# Patient Record
Sex: FEMALE | Race: ASIAN | NOT HISPANIC OR LATINO | URBAN - METROPOLITAN AREA
[De-identification: names, ages, dates, MRNs, and addresses within clinical notes are randomized per-mention and may not be internally consistent; named-entity substitution may affect disease eponyms.]

---

## 2020-06-18 ENCOUNTER — OFFICE VISIT (OUTPATIENT)
Dept: URBAN - METROPOLITAN AREA CLINIC 113 | Facility: CLINIC | Age: 50
End: 2020-06-18

## 2020-07-25 ENCOUNTER — TELEPHONE ENCOUNTER (OUTPATIENT)
Dept: URBAN - METROPOLITAN AREA CLINIC 13 | Facility: CLINIC | Age: 50
End: 2020-07-25

## 2020-07-25 RX ORDER — METRONIDAZOLE 500 MG/1
TAKE 1 CAPSULE 3 TIMES DAILY TABLET ORAL
Qty: 21 | Refills: 0 | OUTPATIENT
Start: 2016-06-10 | End: 2017-03-07

## 2020-07-25 RX ORDER — FOLIC ACID 20 MG
TAKE 1 CAPSULE DAILY CAPSULE ORAL
Refills: 0 | OUTPATIENT
Start: 2011-01-20 | End: 2011-02-09

## 2020-07-25 RX ORDER — PHENOBARBITAL, HYOSCYAMINE SULFATE, ATROPINE SULFATE, SCOPOLAMINE HYDROBROMIDE 16.2; .1037; .0194; .0065 MG/1; MG/1; MG/1; MG/1
TAKE 1 TABLET BY MOUTH TWICE A DAY AS NEEDED TABLET ORAL
Qty: 1 | Refills: 3 | OUTPATIENT
Start: 2014-06-25 | End: 2020-06-18

## 2020-07-25 RX ORDER — ETANERCEPT 25 MG
INJECT 25 MG WEEKLY KIT SUBCUTANEOUS
Refills: 0 | OUTPATIENT
End: 2019-10-01

## 2020-07-25 RX ORDER — LIFITEGRAST 50 MG/ML
INSTILL 1 DROP TWICE DAILY IN BOTH EYES SOLUTION/ DROPS OPHTHALMIC
Refills: 0 | OUTPATIENT
Start: 2018-01-05 | End: 2019-10-01

## 2020-07-25 RX ORDER — ONDANSETRON HYDROCHLORIDE 4 MG/1
TAKE 1 TABLET EVERY 8 HRS PRN FOR NAUSEA TABLET, FILM COATED ORAL
Refills: 0 | OUTPATIENT
End: 2019-10-01

## 2020-07-25 RX ORDER — HYOSCYAMINE SULFATE 0.12 MG/1
PLACE 1 TABLET EVERY 4-6 HOURS PRN ABDOMINAL PAIN TABLET, ORALLY DISINTEGRATING ORAL
Qty: 30 | Refills: 0 | OUTPATIENT
Start: 2016-02-11 | End: 2018-04-12

## 2020-07-25 RX ORDER — POLYETHYLENE GLYCOL 3350, SODIUM CHLORIDE, SODIUM BICARBONATE AND POTASSIUM CHLORIDE WITH LEMON FLAVOR 420; 11.2; 5.72; 1.48 G/4L; G/4L; G/4L; G/4L
TAKE AS DIRECTED POWDER, FOR SOLUTION ORAL
Qty: 1 | Refills: 0 | OUTPATIENT
Start: 2018-11-27 | End: 2018-12-05

## 2020-07-25 RX ORDER — TRAMADOL HYDROCHLORIDE 50 MG/1
TAKE 1 TABLET DAILY TABLET ORAL
Refills: 0 | OUTPATIENT
End: 2019-10-01

## 2020-07-25 RX ORDER — METRONIDAZOLE 500 MG/1
TAKE 1 TABLET EVERY 8 HOURS TABLET ORAL
Refills: 0 | OUTPATIENT
End: 2019-04-30

## 2020-07-25 RX ORDER — CIPROFLOXACIN HYDROCHLORIDE 500 MG/1
TAKE 1 TABLET TWICE DAILY TABLET, FILM COATED ORAL
Refills: 0 | OUTPATIENT
End: 2019-04-30

## 2020-07-25 RX ORDER — HYDROCODONE BITARTRATE AND ACETAMINOPHEN 5; 325 MG/1; MG/1
TAKE 1 TABLET EVERY 4 TO 6 HOURS AS NEEDED FOR PAIN TABLET ORAL
Refills: 0 | OUTPATIENT
End: 2015-12-10

## 2020-07-25 RX ORDER — ESOMEPRAZOLE MAGNESIUM 40 MG
TAKE 1 CAPSULE DAILY CAPSULE,DELAYED RELEASE (ENTERIC COATED) ORAL
Refills: 0 | OUTPATIENT
Start: 2011-01-20 | End: 2011-02-17

## 2020-07-25 RX ORDER — OXYCODONE HYDROCHLORIDE AND ACETAMINOPHEN 5; 325 MG/1; MG/1
TAKE 1 TABLET EVERY 6 HOURS TAKE 1 TABLET BY MOUTH EVERY 6-8 HOURS AS NEEDED TABLET ORAL
Qty: 120 | Refills: 0 | OUTPATIENT
End: 2019-10-01

## 2020-07-25 RX ORDER — CELECOXIB 200 MG/1
TAKE 1 CAPSULE DAILY CAPSULE ORAL
Refills: 0 | OUTPATIENT
End: 2018-04-12

## 2020-07-25 RX ORDER — CELECOXIB 50 MG/1
TAKE 1 CAPSULE TWICE DAILY CAPSULE ORAL
Refills: 0 | OUTPATIENT
Start: 2011-01-20 | End: 2011-02-09

## 2020-07-25 RX ORDER — DICYCLOMINE HYDROCHLORIDE 10 MG/1
TAKE 1 CAPSULE EVERY 6 HOURS PRN ABD PAIN CAPSULE ORAL
Qty: 45 | Refills: 1 | OUTPATIENT
Start: 2019-05-17 | End: 2019-10-01

## 2020-07-25 RX ORDER — LINACLOTIDE 290 UG/1
TAKE 1 CAPSULE DAILY CAPSULE, GELATIN COATED ORAL
Qty: 30 | Refills: 5 | OUTPATIENT
Start: 2013-10-28 | End: 2015-12-10

## 2020-07-25 RX ORDER — METHOTREXATE 2.5 MG/1
TAKE 6 TABLETS WEEKLY TABLET ORAL
Refills: 0 | OUTPATIENT
Start: 2011-01-20 | End: 2011-02-09

## 2020-07-26 ENCOUNTER — TELEPHONE ENCOUNTER (OUTPATIENT)
Dept: URBAN - METROPOLITAN AREA CLINIC 13 | Facility: CLINIC | Age: 50
End: 2020-07-26

## 2020-07-26 RX ORDER — LINACLOTIDE 290 UG/1
CAPSULE, GELATIN COATED ORAL
Qty: 30 | Refills: 0 | Status: ACTIVE | COMMUNITY
Start: 2013-10-28

## 2020-07-26 RX ORDER — PREDNISONE 10 MG/1
U UTD TABLET ORAL
Qty: 21 | Refills: 0 | Status: ACTIVE | COMMUNITY
Start: 2020-03-17

## 2020-07-26 RX ORDER — HYDROCORTISONE ACETATE 25 MG/1
SUPPOSITORY RECTAL
Qty: 12 | Refills: 0 | Status: ACTIVE | COMMUNITY
Start: 2013-10-28

## 2020-07-26 RX ORDER — GABAPENTIN 100 MG/1
CAPSULE ORAL
Qty: 120 | Refills: 0 | Status: ACTIVE | COMMUNITY
Start: 2014-04-07

## 2020-07-26 RX ORDER — OMEPRAZOLE 20 MG/1
CAPSULE, DELAYED RELEASE ORAL
Qty: 30 | Refills: 0 | Status: ACTIVE | COMMUNITY
Start: 2013-01-31

## 2020-07-26 RX ORDER — LINACLOTIDE 290 UG/1
TAKE 1 CAPSULE DAILY TAKE BEFORE MEALS CAPSULE, GELATIN COATED ORAL
Qty: 90 | Refills: 3 | Status: ACTIVE | COMMUNITY
Start: 2015-12-10

## 2020-07-26 RX ORDER — ETANERCEPT 50 MG/ML
SOLUTION SUBCUTANEOUS
Qty: 392 | Refills: 0 | Status: ACTIVE | COMMUNITY
Start: 2017-12-05

## 2020-07-26 RX ORDER — SIMVASTATIN 40 MG/1
TABLET, FILM COATED ORAL
Qty: 30 | Refills: 0 | Status: ACTIVE | COMMUNITY
Start: 2014-02-18

## 2020-07-26 RX ORDER — DICLOFENAC POTASSIUM 50 MG/1
TABLET, FILM COATED ORAL
Qty: 30 | Refills: 0 | Status: ACTIVE | COMMUNITY
Start: 2017-11-08

## 2020-07-26 RX ORDER — PROMETHAZINE HYDROCHLORIDE AND CODEINE PHOSPHATE 6.25; 1 MG/5ML; MG/5ML
TK 5 TO 10 ML PO Q 6 H PRF COUGH SOLUTION ORAL
Qty: 240 | Refills: 0 | Status: ACTIVE | COMMUNITY
Start: 2020-03-25

## 2020-07-26 RX ORDER — BUTALBITAL, ACETAMINOPHEN, AND CAFFEINE 50; 325; 40 MG/1; MG/1; MG/1
TABLET ORAL
Qty: 84 | Refills: 0 | Status: ACTIVE | COMMUNITY
Start: 2014-09-24

## 2020-07-26 RX ORDER — LEVOFLOXACIN 500 MG/1
TK 1 T PO D TABLET, FILM COATED ORAL
Qty: 7 | Refills: 0 | Status: ACTIVE | COMMUNITY
Start: 2020-03-25

## 2020-07-26 RX ORDER — HYDROCODONE BITARTRATE AND ACETAMINOPHEN 7.5; 325 MG/1; MG/1
TABLET ORAL
Qty: 90 | Refills: 0 | Status: ACTIVE | COMMUNITY
Start: 2014-12-05

## 2020-07-26 RX ORDER — TRAMADOL HYDROCHLORIDE 50 MG/1
TABLET ORAL
Qty: 120 | Refills: 0 | Status: ACTIVE | COMMUNITY
Start: 2014-04-07

## 2020-07-26 RX ORDER — PREDNISONE 10 MG/1
TABLET ORAL
Qty: 21 | Refills: 0 | Status: ACTIVE | COMMUNITY
Start: 2019-02-21

## 2020-07-26 RX ORDER — ETANERCEPT 50 MG/ML
INJECT 50 MG WEEKLY SOLUTION SUBCUTANEOUS
Refills: 0 | Status: ACTIVE | COMMUNITY
Start: 2018-10-19

## 2020-07-26 RX ORDER — CARISOPRODOL 350 MG/1
TABLET ORAL
Qty: 30 | Refills: 0 | Status: ACTIVE | COMMUNITY
Start: 2018-06-19

## 2020-07-26 RX ORDER — HYDROCHLOROTHIAZIDE 12.5 MG/1
TK 1 C PO QD CAPSULE ORAL
Qty: 90 | Refills: 0 | Status: ACTIVE | COMMUNITY
Start: 2019-11-22

## 2020-07-26 RX ORDER — DICYCLOMINE HYDROCHLORIDE 10 MG/1
TAKE 1 CAPSULE EVERY 6 HOURS PRN ABDOMINAL PAIN CAPSULE ORAL
Qty: 30 | Refills: 2 | Status: ACTIVE | COMMUNITY
Start: 2020-06-18

## 2020-07-26 RX ORDER — THYROID, PORCINE 60 MG/1
TAKE 1 TABLET DAILY TABLET ORAL
Refills: 0 | Status: ACTIVE | COMMUNITY
Start: 2019-02-12

## 2020-07-26 RX ORDER — CODEINE PHOSPHATE AND GUAIFENESIN 10; 100 MG/5ML; MG/5ML
TK 10 ML PO QID PRF COUGH LIQUID ORAL
Qty: 180 | Refills: 0 | Status: ACTIVE | COMMUNITY
Start: 2020-03-17

## 2020-07-26 RX ORDER — ETANERCEPT 50 MG/ML
SOLUTION SUBCUTANEOUS
Qty: 3 | Refills: 0 | Status: ACTIVE | COMMUNITY
Start: 2018-04-12

## 2020-07-26 RX ORDER — METHOTREXATE 2.5 MG/1
TAKE 8 TABLETS PER WEEK TABLET ORAL
Refills: 0 | Status: ACTIVE | COMMUNITY

## 2020-07-26 RX ORDER — METRONIDAZOLE 500 MG/1
TAKE 1 TABLET 3 TIMES DAILY X 14 DAYS TABLET ORAL
Qty: 42 | Refills: 0 | Status: ACTIVE | COMMUNITY
Start: 2020-06-18

## 2020-07-26 RX ORDER — BUSPIRONE HYDROCHLORIDE 15 MG/1
TABLET ORAL
Qty: 90 | Refills: 0 | Status: ACTIVE | COMMUNITY
Start: 2014-10-20

## 2020-07-26 RX ORDER — ONDANSETRON HYDROCHLORIDE 4 MG/1
TABLET, FILM COATED ORAL
Qty: 8 | Refills: 0 | Status: ACTIVE | COMMUNITY
Start: 2017-11-08

## 2020-07-26 RX ORDER — CARISOPRODOL 350 MG/1
TABLET ORAL
Qty: 90 | Refills: 0 | Status: ACTIVE | COMMUNITY
Start: 2019-02-21

## 2020-07-26 RX ORDER — LIFITEGRAST 50 MG/ML
SOLUTION/ DROPS OPHTHALMIC
Qty: 180 | Refills: 0 | Status: ACTIVE | COMMUNITY
Start: 2018-01-05

## 2020-07-26 RX ORDER — HYDROCODONE BITARTRATE AND HOMATROPINE METHYLBROMIDE 5; 1.5 MG/5ML; MG/5ML
TAKE 1 TEASPOONFUL BY MOUTH 4 TIMES A DAY AS NEEDED SYRUP ORAL
Qty: 200 | Refills: 0 | Status: ACTIVE | COMMUNITY
Start: 2019-11-26

## 2020-07-26 RX ORDER — ESOMEPRAZOLE MAGNESIUM 40 MG
TAKE 1 CAPSULE DAILY CAPSULE,DELAYED RELEASE (ENTERIC COATED) ORAL
Refills: 0 | Status: ACTIVE | COMMUNITY

## 2020-07-26 RX ORDER — FLUTICASONE PROPIONATE 50 UG/1
USE 2 SPRAYS IEN QD SPRAY, METERED NASAL
Qty: 16 | Refills: 0 | Status: ACTIVE | COMMUNITY
Start: 2020-02-25

## 2020-07-26 RX ORDER — AMITRIPTYLINE HYDROCHLORIDE 10 MG/1
TABLET, FILM COATED ORAL
Qty: 90 | Refills: 0 | Status: ACTIVE | COMMUNITY
Start: 2014-10-31

## 2020-07-26 RX ORDER — SIMVASTATIN 40 MG/1
TABLET, FILM COATED ORAL
Qty: 30 | Refills: 0 | Status: ACTIVE | COMMUNITY
Start: 2013-05-28

## 2020-07-26 RX ORDER — OMEPRAZOLE 40 MG/1
CAPSULE, DELAYED RELEASE ORAL
Qty: 90 | Refills: 0 | Status: ACTIVE | COMMUNITY
Start: 2019-04-30

## 2020-07-26 RX ORDER — CIPROFLOXACIN HYDROCHLORIDE 500 MG/1
TAKE ONE TABLET BY MOUTH TWICE A DAY TABLET, FILM COATED ORAL
Qty: 28 | Refills: 0 | Status: ACTIVE | COMMUNITY
Start: 2020-06-18

## 2020-07-26 RX ORDER — SIMVASTATIN 20 MG/1
TAKE 1 TABLET AT BEDTIME TABLET, FILM COATED ORAL
Refills: 0 | Status: ACTIVE | COMMUNITY
Start: 2018-06-19

## 2020-07-26 RX ORDER — TIZANIDINE 4 MG/1
TABLET ORAL
Qty: 30 | Refills: 0 | Status: ACTIVE | COMMUNITY
Start: 2015-01-06

## 2020-07-26 RX ORDER — LINACLOTIDE 145 UG/1
CAPSULE, GELATIN COATED ORAL
Qty: 30 | Refills: 0 | Status: ACTIVE | COMMUNITY
Start: 2013-09-05

## 2020-07-26 RX ORDER — LOTEPREDNOL ETABONATE 5 MG/G
GEL OPHTHALMIC
Qty: 5 | Refills: 0 | Status: ACTIVE | COMMUNITY
Start: 2017-08-18

## 2020-07-26 RX ORDER — SUCRALFATE 1 G/10ML
SUSPENSION ORAL
Qty: 255 | Refills: 0 | Status: ACTIVE | COMMUNITY
Start: 2014-04-22

## 2020-07-26 RX ORDER — ONDANSETRON HYDROCHLORIDE 4 MG/1
TABLET, FILM COATED ORAL
Qty: 40 | Refills: 0 | Status: ACTIVE | COMMUNITY
Start: 2014-10-02

## 2020-07-26 RX ORDER — HYDROCODONE BITARTRATE AND ACETAMINOPHEN 10; 325 MG/1; MG/1
TABLET ORAL
Qty: 30 | Refills: 0 | Status: ACTIVE | COMMUNITY
Start: 2014-09-27

## 2020-07-26 RX ORDER — FOLIC ACID 1 MG/1
TAKE 1 TABLET DAILY TABLET ORAL
Refills: 0 | Status: ACTIVE | COMMUNITY

## 2020-07-26 RX ORDER — AMITRIPTYLINE HYDROCHLORIDE 25 MG/1
TABLET, FILM COATED ORAL
Qty: 30 | Refills: 0 | Status: ACTIVE | COMMUNITY
Start: 2014-09-27

## 2020-07-26 RX ORDER — ETANERCEPT 50 MG/ML
SOLUTION SUBCUTANEOUS
Qty: 392 | Refills: 0 | Status: ACTIVE | COMMUNITY
Start: 2018-12-18

## 2020-07-26 RX ORDER — LORAZEPAM 1 MG/1
TK 1 T PO Q 8 H TABLET ORAL
Qty: 10 | Refills: 0 | Status: ACTIVE | COMMUNITY
Start: 2020-01-14

## 2020-07-26 RX ORDER — HYDROCORTISONE ACETATE 25 MG/1
SUPPOSITORY RECTAL
Qty: 28 | Refills: 0 | Status: ACTIVE | COMMUNITY
Start: 2013-06-21

## 2020-07-26 RX ORDER — IBUPROFEN 600 MG/1
TABLET ORAL
Qty: 30 | Refills: 0 | Status: ACTIVE | COMMUNITY
Start: 2014-11-05

## 2020-07-26 RX ORDER — DOXYCYCLINE HYCLATE 100 MG/1
TK 1 T PO BID FOR 10 DAYS TABLET ORAL
Qty: 20 | Refills: 0 | Status: ACTIVE | COMMUNITY
Start: 2020-03-17

## 2021-06-13 ENCOUNTER — ERX REFILL RESPONSE (OUTPATIENT)
Dept: URBAN - METROPOLITAN AREA CLINIC 72 | Facility: CLINIC | Age: 51
End: 2021-06-13

## 2021-06-13 RX ORDER — LINACLOTIDE 290 UG/1
TAKE 1 CAPSULE DAILY BEFORE A MEAL CAPSULE, GELATIN COATED ORAL
Qty: 90 | Refills: 0

## 2021-07-20 ENCOUNTER — OFFICE VISIT (OUTPATIENT)
Dept: URBAN - METROPOLITAN AREA CLINIC 113 | Facility: CLINIC | Age: 51
End: 2021-07-20

## 2021-07-30 ENCOUNTER — OFFICE VISIT (OUTPATIENT)
Dept: URBAN - METROPOLITAN AREA CLINIC 113 | Facility: CLINIC | Age: 51
End: 2021-07-30
Payer: COMMERCIAL

## 2021-07-30 VITALS
BODY MASS INDEX: 26.62 KG/M2 | WEIGHT: 141 LBS | SYSTOLIC BLOOD PRESSURE: 115 MMHG | HEIGHT: 61 IN | HEART RATE: 73 BPM | TEMPERATURE: 98.2 F | DIASTOLIC BLOOD PRESSURE: 73 MMHG

## 2021-07-30 DIAGNOSIS — K59.01 SLOW TRANSIT CONSTIPATION: ICD-10-CM

## 2021-07-30 DIAGNOSIS — R10.13 EPIGASTRIC ABDOMINAL PAIN: ICD-10-CM

## 2021-07-30 DIAGNOSIS — K21.9 GERD: ICD-10-CM

## 2021-07-30 PROCEDURE — 99214 OFFICE O/P EST MOD 30 MIN: CPT | Performed by: INTERNAL MEDICINE

## 2021-07-30 RX ORDER — HYDROCODONE BITARTRATE AND HOMATROPINE METHYLBROMIDE 5; 1.5 MG/5ML; MG/5ML
TAKE 1 TEASPOONFUL BY MOUTH 4 TIMES A DAY AS NEEDED SYRUP ORAL
Qty: 200 | Refills: 0 | Status: ON HOLD | COMMUNITY
Start: 2019-11-26

## 2021-07-30 RX ORDER — FOLIC ACID 1 MG/1
TAKE 1 TABLET DAILY TABLET ORAL
Refills: 0 | Status: ON HOLD | COMMUNITY

## 2021-07-30 RX ORDER — AMITRIPTYLINE HYDROCHLORIDE 10 MG/1
TABLET, FILM COATED ORAL
Qty: 90 | Refills: 0 | Status: ON HOLD | COMMUNITY
Start: 2014-10-31

## 2021-07-30 RX ORDER — TIZANIDINE 4 MG/1
TABLET ORAL
Qty: 30 | Refills: 0 | Status: ON HOLD | COMMUNITY
Start: 2015-01-06

## 2021-07-30 RX ORDER — BUSPIRONE HYDROCHLORIDE 15 MG/1
TABLET ORAL
Qty: 90 | Refills: 0 | Status: ON HOLD | COMMUNITY
Start: 2014-10-20

## 2021-07-30 RX ORDER — CODEINE PHOSPHATE AND GUAIFENESIN 10; 100 MG/5ML; MG/5ML
TK 10 ML PO QID PRF COUGH LIQUID ORAL
Qty: 180 | Refills: 0 | Status: ON HOLD | COMMUNITY
Start: 2020-03-17

## 2021-07-30 RX ORDER — DOXYCYCLINE HYCLATE 100 MG/1
TK 1 T PO BID FOR 10 DAYS TABLET ORAL
Qty: 20 | Refills: 0 | Status: ON HOLD | COMMUNITY
Start: 2020-03-17

## 2021-07-30 RX ORDER — LEVOFLOXACIN 500 MG/1
TK 1 T PO D TABLET, FILM COATED ORAL
Qty: 7 | Refills: 0 | Status: ON HOLD | COMMUNITY
Start: 2020-03-25

## 2021-07-30 RX ORDER — METHOTREXATE 2.5 MG/1
TAKE 8 TABLETS PER WEEK TABLET ORAL
Refills: 0 | Status: ACTIVE | COMMUNITY

## 2021-07-30 RX ORDER — DICYCLOMINE HYDROCHLORIDE 10 MG/1
TAKE 1 CAPSULE EVERY 6 HOURS PRN ABDOMINAL PAIN CAPSULE ORAL
Qty: 30 | Refills: 2 | Status: ON HOLD | COMMUNITY
Start: 2020-06-18

## 2021-07-30 RX ORDER — DICLOFENAC POTASSIUM 50 MG/1
TABLET, FILM COATED ORAL
Qty: 30 | Refills: 0 | Status: ON HOLD | COMMUNITY
Start: 2017-11-08

## 2021-07-30 RX ORDER — LOTEPREDNOL ETABONATE 5 MG/G
GEL OPHTHALMIC
Qty: 5 | Refills: 0 | Status: ON HOLD | COMMUNITY
Start: 2017-08-18

## 2021-07-30 RX ORDER — LINACLOTIDE 145 UG/1
CAPSULE, GELATIN COATED ORAL
Qty: 30 | Refills: 0 | Status: ACTIVE | COMMUNITY
Start: 2013-09-05

## 2021-07-30 RX ORDER — LORAZEPAM 1 MG/1
TK 1 T PO Q 8 H TABLET ORAL
Qty: 10 | Refills: 0 | Status: ON HOLD | COMMUNITY
Start: 2020-01-14

## 2021-07-30 RX ORDER — HYDROCODONE BITARTRATE AND ACETAMINOPHEN 7.5; 325 MG/1; MG/1
TABLET ORAL
Qty: 90 | Refills: 0 | Status: ON HOLD | COMMUNITY
Start: 2014-12-05

## 2021-07-30 RX ORDER — GABAPENTIN 100 MG/1
CAPSULE ORAL
Qty: 120 | Refills: 0 | Status: ON HOLD | COMMUNITY
Start: 2014-04-07

## 2021-07-30 RX ORDER — SUCRALFATE 1 G/10ML
SUSPENSION ORAL
Qty: 255 | Refills: 0 | Status: ON HOLD | COMMUNITY
Start: 2014-04-22

## 2021-07-30 RX ORDER — HYDROCHLOROTHIAZIDE 12.5 MG/1
TK 1 C PO QD CAPSULE ORAL
Qty: 90 | Refills: 0 | Status: ON HOLD | COMMUNITY
Start: 2019-11-22

## 2021-07-30 RX ORDER — OMEPRAZOLE 40 MG/1
1 CAPSULE 30 MINUTES BEFORE MORNING MEAL CAPSULE, DELAYED RELEASE ORAL ONCE A DAY
Refills: 0 | Status: ACTIVE | COMMUNITY
Start: 2013-01-31

## 2021-07-30 RX ORDER — ESOMEPRAZOLE MAGNESIUM 40 MG
TAKE 1 CAPSULE DAILY CAPSULE,DELAYED RELEASE (ENTERIC COATED) ORAL
Refills: 0 | Status: ON HOLD | COMMUNITY

## 2021-07-30 RX ORDER — LINACLOTIDE 290 UG/1
TAKE 1 CAPSULE DAILY BEFORE A MEAL CAPSULE, GELATIN COATED ORAL
Qty: 90 | Refills: 0 | Status: ON HOLD | COMMUNITY

## 2021-07-30 RX ORDER — ETANERCEPT 50 MG/ML
SOLUTION SUBCUTANEOUS
Qty: 392 | Refills: 0 | Status: ACTIVE | COMMUNITY
Start: 2017-12-05

## 2021-07-30 RX ORDER — CARISOPRODOL 350 MG/1
TABLET ORAL
Qty: 30 | Refills: 0 | Status: ON HOLD | COMMUNITY
Start: 2018-06-19

## 2021-07-30 RX ORDER — TRAMADOL HYDROCHLORIDE 50 MG/1
TABLET ORAL
Qty: 120 | Refills: 0 | Status: ON HOLD | COMMUNITY
Start: 2014-04-07

## 2021-07-30 RX ORDER — ONDANSETRON HYDROCHLORIDE 4 MG/1
TABLET, FILM COATED ORAL
Qty: 40 | Refills: 0 | Status: ON HOLD | COMMUNITY
Start: 2014-10-02

## 2021-07-30 RX ORDER — PROMETHAZINE HYDROCHLORIDE AND CODEINE PHOSPHATE 6.25; 1 MG/5ML; MG/5ML
TK 5 TO 10 ML PO Q 6 H PRF COUGH SOLUTION ORAL
Qty: 240 | Refills: 0 | Status: ON HOLD | COMMUNITY
Start: 2020-03-25

## 2021-07-30 RX ORDER — THYROID, PORCINE 60 MG/1
TAKE 1 TABLET DAILY TABLET ORAL
Refills: 0 | Status: ON HOLD | COMMUNITY
Start: 2019-02-12

## 2021-07-30 RX ORDER — IBUPROFEN 600 MG/1
TABLET ORAL
Qty: 30 | Refills: 0 | Status: ON HOLD | COMMUNITY
Start: 2014-11-05

## 2021-07-30 RX ORDER — FLUTICASONE PROPIONATE 50 UG/1
USE 2 SPRAYS IEN QD SPRAY, METERED NASAL
Qty: 16 | Refills: 0 | Status: ON HOLD | COMMUNITY
Start: 2020-02-25

## 2021-07-30 RX ORDER — OMEPRAZOLE 40 MG/1
CAPSULE, DELAYED RELEASE ORAL
Qty: 90 | Refills: 0 | Status: ON HOLD | COMMUNITY
Start: 2019-04-30

## 2021-07-30 RX ORDER — METRONIDAZOLE 500 MG/1
TAKE 1 TABLET 3 TIMES DAILY X 14 DAYS TABLET ORAL
Qty: 42 | Refills: 0 | Status: ON HOLD | COMMUNITY
Start: 2020-06-18

## 2021-07-30 RX ORDER — SIMVASTATIN 40 MG/1
TABLET, FILM COATED ORAL
Qty: 30 | Refills: 0 | Status: ON HOLD | COMMUNITY
Start: 2013-05-28

## 2021-07-30 RX ORDER — AMITRIPTYLINE HYDROCHLORIDE 25 MG/1
TABLET, FILM COATED ORAL
Qty: 30 | Refills: 0 | Status: ON HOLD | COMMUNITY
Start: 2014-09-27

## 2021-07-30 RX ORDER — CIPROFLOXACIN HYDROCHLORIDE 500 MG/1
TAKE ONE TABLET BY MOUTH TWICE A DAY TABLET, FILM COATED ORAL
Qty: 28 | Refills: 0 | Status: ON HOLD | COMMUNITY
Start: 2020-06-18

## 2021-07-30 RX ORDER — BUTALBITAL, ACETAMINOPHEN, AND CAFFEINE 50; 325; 40 MG/1; MG/1; MG/1
TABLET ORAL
Qty: 84 | Refills: 0 | Status: ON HOLD | COMMUNITY
Start: 2014-09-24

## 2021-07-30 RX ORDER — HYDROCORTISONE ACETATE 25 MG/1
SUPPOSITORY RECTAL
Qty: 28 | Refills: 0 | Status: ON HOLD | COMMUNITY
Start: 2013-06-21

## 2021-07-30 RX ORDER — PREDNISONE 10 MG/1
TABLET ORAL
Qty: 21 | Refills: 0 | Status: ON HOLD | COMMUNITY
Start: 2019-02-21

## 2021-07-30 RX ORDER — OMEPRAZOLE 40 MG/1
1 CAPSULE TWICE DAILY, PRIOR TO BREAKFAST AND DINNER CAPSULE, DELAYED RELEASE ORAL TWICE DAILY
Qty: 180 | Refills: 3 | OUTPATIENT
Start: 2021-07-30

## 2021-07-30 RX ORDER — HYDROCODONE BITARTRATE AND ACETAMINOPHEN 10; 325 MG/1; MG/1
TABLET ORAL
Qty: 30 | Refills: 0 | Status: ON HOLD | COMMUNITY
Start: 2014-09-27

## 2021-07-30 RX ORDER — SIMVASTATIN 20 MG/1
TAKE 1 TABLET AT BEDTIME TABLET, FILM COATED ORAL
Refills: 0 | Status: ACTIVE | COMMUNITY
Start: 2018-06-19

## 2021-07-30 RX ORDER — LIFITEGRAST 50 MG/ML
SOLUTION/ DROPS OPHTHALMIC
Qty: 180 | Refills: 0 | Status: ON HOLD | COMMUNITY
Start: 2018-01-05

## 2021-07-30 NOTE — HPI-TODAY'S VISIT:
50-year-old female with a history of rheumatoid arthritis, hypothyroidism, constipation predominant irritable bowel syndrome, GERD, diverticulitis, presenting for follow-up. She was last seen 6/18/2020 for evaluation of left lower quadrant abdominal pain concerning for diverticulitis.  She has recommended a 14-day course of Cipro and metronidazole, and dicyclomine was provided to use as needed.  Regarding constipation, she was instructed to continue a daily bowel regimen with Linzess 290 mcg. Within the last week, she has experienced nightly exacerbations of epigastric pain. The discomfort begins following dinner, radiating to her back. This is alleviated with the use of OTC Nexium or Tums. She denies associated nausea or vomiting, but does complain of breakthrough heartburn and regurgitation a few times per week despite her current regimen with omeprazole 40mg daily. Her constipation is managed with Linzess, but she thinks the dose was reduced to 145mcg daily. She denies NSAID use. Her last EGD was performed in 2013 by Dr. Jones and was normal.

## 2021-08-16 ENCOUNTER — ERX REFILL RESPONSE (OUTPATIENT)
Dept: URBAN - METROPOLITAN AREA CLINIC 72 | Facility: CLINIC | Age: 51
End: 2021-08-16

## 2021-08-16 RX ORDER — LINACLOTIDE 290 UG/1
TAKE 1 CAPSULE BY MOUTH  DAILY BEFORE A MEAL CAPSULE, GELATIN COATED ORAL
Qty: 90 CAPSULE | Refills: 4 | OUTPATIENT

## 2021-08-31 ENCOUNTER — OFFICE VISIT (OUTPATIENT)
Dept: URBAN - METROPOLITAN AREA SURGERY CENTER 25 | Facility: SURGERY CENTER | Age: 51
End: 2021-08-31

## 2021-09-03 ENCOUNTER — CLAIMS CREATED FROM THE CLAIM WINDOW (OUTPATIENT)
Dept: URBAN - METROPOLITAN AREA CLINIC 4 | Facility: CLINIC | Age: 51
End: 2021-09-03
Payer: COMMERCIAL

## 2021-09-03 ENCOUNTER — OFFICE VISIT (OUTPATIENT)
Dept: URBAN - METROPOLITAN AREA SURGERY CENTER 25 | Facility: SURGERY CENTER | Age: 51
End: 2021-09-03
Payer: COMMERCIAL

## 2021-09-03 DIAGNOSIS — K31.89 REACTIVE GASTROPATHY: ICD-10-CM

## 2021-09-03 DIAGNOSIS — R10.13 ABDOMINAL PAIN, EPIGASTRIC: ICD-10-CM

## 2021-09-03 DIAGNOSIS — K31.89 SUBEPITHELIAL MASS OF STOMACH: ICD-10-CM

## 2021-09-03 PROCEDURE — 43239 EGD BIOPSY SINGLE/MULTIPLE: CPT | Performed by: INTERNAL MEDICINE

## 2021-09-03 PROCEDURE — 88312 SPECIAL STAINS GROUP 1: CPT | Performed by: PATHOLOGY

## 2021-09-03 PROCEDURE — 88305 TISSUE EXAM BY PATHOLOGIST: CPT | Performed by: PATHOLOGY

## 2021-09-03 PROCEDURE — G8907 PT DOC NO EVENTS ON DISCHARG: HCPCS | Performed by: INTERNAL MEDICINE

## 2021-09-03 RX ORDER — AMITRIPTYLINE HYDROCHLORIDE 10 MG/1
TABLET, FILM COATED ORAL
Qty: 90 | Refills: 0 | Status: ON HOLD | COMMUNITY
Start: 2014-10-31

## 2021-09-03 RX ORDER — ETANERCEPT 50 MG/ML
SOLUTION SUBCUTANEOUS
Qty: 392 | Refills: 0 | Status: ACTIVE | COMMUNITY
Start: 2017-12-05

## 2021-09-03 RX ORDER — CARISOPRODOL 350 MG/1
TABLET ORAL
Qty: 30 | Refills: 0 | Status: ON HOLD | COMMUNITY
Start: 2018-06-19

## 2021-09-03 RX ORDER — IBUPROFEN 600 MG/1
TABLET ORAL
Qty: 30 | Refills: 0 | Status: ON HOLD | COMMUNITY
Start: 2014-11-05

## 2021-09-03 RX ORDER — HYDROCHLOROTHIAZIDE 12.5 MG/1
TK 1 C PO QD CAPSULE ORAL
Qty: 90 | Refills: 0 | Status: ON HOLD | COMMUNITY
Start: 2019-11-22

## 2021-09-03 RX ORDER — DICYCLOMINE HYDROCHLORIDE 10 MG/1
TAKE 1 CAPSULE EVERY 6 HOURS PRN ABDOMINAL PAIN CAPSULE ORAL
Qty: 30 | Refills: 2 | Status: ON HOLD | COMMUNITY
Start: 2020-06-18

## 2021-09-03 RX ORDER — ONDANSETRON HYDROCHLORIDE 4 MG/1
TABLET, FILM COATED ORAL
Qty: 40 | Refills: 0 | Status: ON HOLD | COMMUNITY
Start: 2014-10-02

## 2021-09-03 RX ORDER — ESOMEPRAZOLE MAGNESIUM 40 MG
TAKE 1 CAPSULE DAILY CAPSULE,DELAYED RELEASE (ENTERIC COATED) ORAL
Refills: 0 | Status: ON HOLD | COMMUNITY

## 2021-09-03 RX ORDER — OMEPRAZOLE 40 MG/1
1 CAPSULE 30 MINUTES BEFORE MORNING MEAL CAPSULE, DELAYED RELEASE ORAL ONCE A DAY
Refills: 0 | Status: ACTIVE | COMMUNITY
Start: 2013-01-31

## 2021-09-03 RX ORDER — LOTEPREDNOL ETABONATE 5 MG/G
GEL OPHTHALMIC
Qty: 5 | Refills: 0 | Status: ON HOLD | COMMUNITY
Start: 2017-08-18

## 2021-09-03 RX ORDER — FOLIC ACID 1 MG/1
TAKE 1 TABLET DAILY TABLET ORAL
Refills: 0 | Status: ON HOLD | COMMUNITY

## 2021-09-03 RX ORDER — SIMVASTATIN 40 MG/1
TABLET, FILM COATED ORAL
Qty: 30 | Refills: 0 | Status: ON HOLD | COMMUNITY
Start: 2013-05-28

## 2021-09-03 RX ORDER — HYDROCODONE BITARTRATE AND ACETAMINOPHEN 10; 325 MG/1; MG/1
TABLET ORAL
Qty: 30 | Refills: 0 | Status: ON HOLD | COMMUNITY
Start: 2014-09-27

## 2021-09-03 RX ORDER — BUTALBITAL, ACETAMINOPHEN, AND CAFFEINE 50; 325; 40 MG/1; MG/1; MG/1
TABLET ORAL
Qty: 84 | Refills: 0 | Status: ON HOLD | COMMUNITY
Start: 2014-09-24

## 2021-09-03 RX ORDER — LIFITEGRAST 50 MG/ML
SOLUTION/ DROPS OPHTHALMIC
Qty: 180 | Refills: 0 | Status: ON HOLD | COMMUNITY
Start: 2018-01-05

## 2021-09-03 RX ORDER — LEVOFLOXACIN 500 MG/1
TK 1 T PO D TABLET, FILM COATED ORAL
Qty: 7 | Refills: 0 | Status: ON HOLD | COMMUNITY
Start: 2020-03-25

## 2021-09-03 RX ORDER — OMEPRAZOLE 40 MG/1
1 CAPSULE TWICE DAILY, PRIOR TO BREAKFAST AND DINNER CAPSULE, DELAYED RELEASE ORAL TWICE DAILY
Qty: 180 | Refills: 3 | Status: ACTIVE | COMMUNITY
Start: 2021-07-30

## 2021-09-03 RX ORDER — LINACLOTIDE 290 UG/1
TAKE 1 CAPSULE BY MOUTH  DAILY BEFORE A MEAL CAPSULE, GELATIN COATED ORAL
Qty: 90 CAPSULE | Refills: 4 | Status: ACTIVE | COMMUNITY

## 2021-09-03 RX ORDER — AMITRIPTYLINE HYDROCHLORIDE 25 MG/1
TABLET, FILM COATED ORAL
Qty: 30 | Refills: 0 | Status: ON HOLD | COMMUNITY
Start: 2014-09-27

## 2021-09-03 RX ORDER — CIPROFLOXACIN HYDROCHLORIDE 500 MG/1
TAKE ONE TABLET BY MOUTH TWICE A DAY TABLET, FILM COATED ORAL
Qty: 28 | Refills: 0 | Status: ON HOLD | COMMUNITY
Start: 2020-06-18

## 2021-09-03 RX ORDER — HYDROCORTISONE ACETATE 25 MG/1
SUPPOSITORY RECTAL
Qty: 28 | Refills: 0 | Status: ON HOLD | COMMUNITY
Start: 2013-06-21

## 2021-09-03 RX ORDER — HYDROCODONE BITARTRATE AND HOMATROPINE METHYLBROMIDE 5; 1.5 MG/5ML; MG/5ML
TAKE 1 TEASPOONFUL BY MOUTH 4 TIMES A DAY AS NEEDED SYRUP ORAL
Qty: 200 | Refills: 0 | Status: ON HOLD | COMMUNITY
Start: 2019-11-26

## 2021-09-03 RX ORDER — SUCRALFATE 1 G/10ML
SUSPENSION ORAL
Qty: 255 | Refills: 0 | Status: ON HOLD | COMMUNITY
Start: 2014-04-22

## 2021-09-03 RX ORDER — THYROID, PORCINE 60 MG/1
TAKE 1 TABLET DAILY TABLET ORAL
Refills: 0 | Status: ON HOLD | COMMUNITY
Start: 2019-02-12

## 2021-09-03 RX ORDER — METHOTREXATE 2.5 MG/1
TAKE 8 TABLETS PER WEEK TABLET ORAL
Refills: 0 | Status: ACTIVE | COMMUNITY

## 2021-09-03 RX ORDER — DICLOFENAC POTASSIUM 50 MG/1
TABLET, FILM COATED ORAL
Qty: 30 | Refills: 0 | Status: ON HOLD | COMMUNITY
Start: 2017-11-08

## 2021-09-03 RX ORDER — TIZANIDINE 4 MG/1
TABLET ORAL
Qty: 30 | Refills: 0 | Status: ON HOLD | COMMUNITY
Start: 2015-01-06

## 2021-09-03 RX ORDER — SIMVASTATIN 20 MG/1
TAKE 1 TABLET AT BEDTIME TABLET, FILM COATED ORAL
Refills: 0 | Status: ACTIVE | COMMUNITY
Start: 2018-06-19

## 2021-09-03 RX ORDER — BUSPIRONE HYDROCHLORIDE 15 MG/1
TABLET ORAL
Qty: 90 | Refills: 0 | Status: ON HOLD | COMMUNITY
Start: 2014-10-20

## 2021-09-03 RX ORDER — PREDNISONE 10 MG/1
TABLET ORAL
Qty: 21 | Refills: 0 | Status: ON HOLD | COMMUNITY
Start: 2019-02-21

## 2021-09-03 RX ORDER — DOXYCYCLINE HYCLATE 100 MG/1
TK 1 T PO BID FOR 10 DAYS TABLET ORAL
Qty: 20 | Refills: 0 | Status: ON HOLD | COMMUNITY
Start: 2020-03-17

## 2021-09-03 RX ORDER — GABAPENTIN 100 MG/1
CAPSULE ORAL
Qty: 120 | Refills: 0 | Status: ON HOLD | COMMUNITY
Start: 2014-04-07

## 2021-09-03 RX ORDER — TRAMADOL HYDROCHLORIDE 50 MG/1
TABLET ORAL
Qty: 120 | Refills: 0 | Status: ON HOLD | COMMUNITY
Start: 2014-04-07

## 2021-09-03 RX ORDER — FLUTICASONE PROPIONATE 50 UG/1
USE 2 SPRAYS IEN QD SPRAY, METERED NASAL
Qty: 16 | Refills: 0 | Status: ON HOLD | COMMUNITY
Start: 2020-02-25

## 2021-09-03 RX ORDER — LINACLOTIDE 145 UG/1
CAPSULE, GELATIN COATED ORAL
Qty: 30 | Refills: 0 | Status: ACTIVE | COMMUNITY
Start: 2013-09-05

## 2021-09-03 RX ORDER — HYDROCODONE BITARTRATE AND ACETAMINOPHEN 7.5; 325 MG/1; MG/1
TABLET ORAL
Qty: 90 | Refills: 0 | Status: ON HOLD | COMMUNITY
Start: 2014-12-05

## 2021-09-03 RX ORDER — METRONIDAZOLE 500 MG/1
TAKE 1 TABLET 3 TIMES DAILY X 14 DAYS TABLET ORAL
Qty: 42 | Refills: 0 | Status: ON HOLD | COMMUNITY
Start: 2020-06-18

## 2021-09-03 RX ORDER — LORAZEPAM 1 MG/1
TK 1 T PO Q 8 H TABLET ORAL
Qty: 10 | Refills: 0 | Status: ON HOLD | COMMUNITY
Start: 2020-01-14

## 2021-09-03 RX ORDER — CODEINE PHOSPHATE AND GUAIFENESIN 10; 100 MG/5ML; MG/5ML
TK 10 ML PO QID PRF COUGH LIQUID ORAL
Qty: 180 | Refills: 0 | Status: ON HOLD | COMMUNITY
Start: 2020-03-17

## 2021-09-03 RX ORDER — PROMETHAZINE HYDROCHLORIDE AND CODEINE PHOSPHATE 6.25; 1 MG/5ML; MG/5ML
TK 5 TO 10 ML PO Q 6 H PRF COUGH SOLUTION ORAL
Qty: 240 | Refills: 0 | Status: ON HOLD | COMMUNITY
Start: 2020-03-25

## 2021-09-14 ENCOUNTER — OFFICE VISIT (OUTPATIENT)
Dept: URBAN - METROPOLITAN AREA CLINIC 113 | Facility: CLINIC | Age: 51
End: 2021-09-14
Payer: COMMERCIAL

## 2021-09-14 ENCOUNTER — WEB ENCOUNTER (OUTPATIENT)
Dept: URBAN - METROPOLITAN AREA CLINIC 113 | Facility: CLINIC | Age: 51
End: 2021-09-14

## 2021-09-14 VITALS — RESPIRATION RATE: 18 BRPM | HEIGHT: 61 IN | WEIGHT: 140 LBS | TEMPERATURE: 98.2 F | BODY MASS INDEX: 26.43 KG/M2

## 2021-09-14 DIAGNOSIS — K21.9 GERD: ICD-10-CM

## 2021-09-14 DIAGNOSIS — K59.01 SLOW TRANSIT CONSTIPATION: ICD-10-CM

## 2021-09-14 DIAGNOSIS — R10.13 EPIGASTRIC ABDOMINAL PAIN: ICD-10-CM

## 2021-09-14 DIAGNOSIS — R68.81 EARLY SATIETY: ICD-10-CM

## 2021-09-14 DIAGNOSIS — R11.0 NAUSEA: ICD-10-CM

## 2021-09-14 PROBLEM — 235595009 GASTROESOPHAGEAL REFLUX DISEASE: Status: ACTIVE | Noted: 2021-07-30

## 2021-09-14 PROCEDURE — 99214 OFFICE O/P EST MOD 30 MIN: CPT | Performed by: INTERNAL MEDICINE

## 2021-09-14 PROCEDURE — 74177 CT ABD & PELVIS W/CONTRAST: CPT | Performed by: INTERNAL MEDICINE

## 2021-09-14 RX ORDER — LINACLOTIDE 145 UG/1
CAPSULE, GELATIN COATED ORAL
Qty: 30 | Refills: 0 | Status: DISCONTINUED | COMMUNITY
Start: 2013-09-05

## 2021-09-14 RX ORDER — HYDROCHLOROTHIAZIDE 12.5 MG/1
TK 1 C PO QD CAPSULE ORAL
Qty: 90 | Refills: 0 | Status: DISCONTINUED | COMMUNITY
Start: 2019-11-22

## 2021-09-14 RX ORDER — ESOMEPRAZOLE MAGNESIUM 40 MG
TAKE 1 CAPSULE DAILY CAPSULE,DELAYED RELEASE (ENTERIC COATED) ORAL
Refills: 0 | Status: DISCONTINUED | COMMUNITY

## 2021-09-14 RX ORDER — BUTALBITAL, ACETAMINOPHEN, AND CAFFEINE 50; 325; 40 MG/1; MG/1; MG/1
TABLET ORAL
Qty: 84 | Refills: 0 | Status: DISCONTINUED | COMMUNITY
Start: 2014-09-24

## 2021-09-14 RX ORDER — AMITRIPTYLINE HYDROCHLORIDE 25 MG/1
TABLET, FILM COATED ORAL
Qty: 30 | Refills: 0 | Status: DISCONTINUED | COMMUNITY
Start: 2014-09-27

## 2021-09-14 RX ORDER — SUCRALFATE 1 G/10ML
SUSPENSION ORAL
Qty: 255 | Refills: 0 | Status: DISCONTINUED | COMMUNITY
Start: 2014-04-22

## 2021-09-14 RX ORDER — AMITRIPTYLINE HYDROCHLORIDE 10 MG/1
TABLET, FILM COATED ORAL
Qty: 90 | Refills: 0 | Status: DISCONTINUED | COMMUNITY
Start: 2014-10-31

## 2021-09-14 RX ORDER — CIPROFLOXACIN HYDROCHLORIDE 500 MG/1
TAKE ONE TABLET BY MOUTH TWICE A DAY TABLET, FILM COATED ORAL
Qty: 28 | Refills: 0 | Status: DISCONTINUED | COMMUNITY
Start: 2020-06-18

## 2021-09-14 RX ORDER — HYDROCODONE BITARTRATE AND ACETAMINOPHEN 10; 325 MG/1; MG/1
TABLET ORAL
Qty: 30 | Refills: 0 | Status: DISCONTINUED | COMMUNITY
Start: 2014-09-27

## 2021-09-14 RX ORDER — FLUTICASONE PROPIONATE 50 UG/1
USE 2 SPRAYS IEN QD SPRAY, METERED NASAL
Qty: 16 | Refills: 0 | Status: DISCONTINUED | COMMUNITY
Start: 2020-02-25

## 2021-09-14 RX ORDER — LOTEPREDNOL ETABONATE 5 MG/G
GEL OPHTHALMIC
Qty: 5 | Refills: 0 | Status: DISCONTINUED | COMMUNITY
Start: 2017-08-18

## 2021-09-14 RX ORDER — HYOSCYAMINE SULFATE 0.12 MG/1
PLACE 1 TABLET UNDER THE TONGUE AND ALLOW TO DISSOLVE AS NEEDED FOR ABDOMINAL PAIN TABLET SUBLINGUAL
Qty: 45 | Refills: 1 | OUTPATIENT
Start: 2021-09-14 | End: 2021-10-04

## 2021-09-14 RX ORDER — DICYCLOMINE HYDROCHLORIDE 10 MG/1
TAKE 1 CAPSULE EVERY 6 HOURS PRN ABDOMINAL PAIN CAPSULE ORAL
Qty: 30 | Refills: 2 | Status: DISCONTINUED | COMMUNITY
Start: 2020-06-18

## 2021-09-14 RX ORDER — SIMVASTATIN 40 MG/1
TABLET, FILM COATED ORAL
Qty: 30 | Refills: 0 | Status: DISCONTINUED | COMMUNITY
Start: 2013-05-28

## 2021-09-14 RX ORDER — TIZANIDINE 4 MG/1
TABLET ORAL
Qty: 30 | Refills: 0 | Status: DISCONTINUED | COMMUNITY
Start: 2015-01-06

## 2021-09-14 RX ORDER — LIFITEGRAST 50 MG/ML
SOLUTION/ DROPS OPHTHALMIC
Qty: 180 | Refills: 0 | Status: ACTIVE | COMMUNITY
Start: 2018-01-05

## 2021-09-14 RX ORDER — HYDROCODONE BITARTRATE AND ACETAMINOPHEN 7.5; 325 MG/1; MG/1
TABLET ORAL
Qty: 90 | Refills: 0 | Status: DISCONTINUED | COMMUNITY
Start: 2014-12-05

## 2021-09-14 RX ORDER — THYROID, PORCINE 60 MG/1
TAKE 1 TABLET DAILY TABLET ORAL
Refills: 0 | Status: ACTIVE | COMMUNITY
Start: 2019-02-12

## 2021-09-14 RX ORDER — OMEPRAZOLE 40 MG/1
1 CAPSULE TWICE DAILY, PRIOR TO BREAKFAST AND DINNER CAPSULE, DELAYED RELEASE ORAL TWICE DAILY
Qty: 180 | Refills: 3 | Status: ACTIVE | COMMUNITY
Start: 2021-07-30

## 2021-09-14 RX ORDER — ETANERCEPT 50 MG/ML
SOLUTION SUBCUTANEOUS
Qty: 392 | Refills: 0 | Status: ACTIVE | COMMUNITY
Start: 2017-12-05

## 2021-09-14 RX ORDER — HYDROCODONE BITARTRATE AND HOMATROPINE METHYLBROMIDE 5; 1.5 MG/5ML; MG/5ML
TAKE 1 TEASPOONFUL BY MOUTH 4 TIMES A DAY AS NEEDED SYRUP ORAL
Qty: 200 | Refills: 0 | Status: DISCONTINUED | COMMUNITY
Start: 2019-11-26

## 2021-09-14 RX ORDER — LINACLOTIDE 290 UG/1
TAKE 1 CAPSULE BY MOUTH  DAILY BEFORE A MEAL CAPSULE, GELATIN COATED ORAL
Qty: 90 CAPSULE | Refills: 4 | Status: ACTIVE | COMMUNITY

## 2021-09-14 RX ORDER — METHOTREXATE 2.5 MG/1
TAKE 8 TABLETS PER WEEK TABLET ORAL
Refills: 0 | Status: ACTIVE | COMMUNITY

## 2021-09-14 RX ORDER — PREDNISONE 10 MG/1
TABLET ORAL
Qty: 21 | Refills: 0 | Status: DISCONTINUED | COMMUNITY
Start: 2019-02-21

## 2021-09-14 RX ORDER — LEVOFLOXACIN 500 MG/1
TK 1 T PO D TABLET, FILM COATED ORAL
Qty: 7 | Refills: 0 | Status: DISCONTINUED | COMMUNITY
Start: 2020-03-25

## 2021-09-14 RX ORDER — DOXYCYCLINE HYCLATE 100 MG/1
TK 1 T PO BID FOR 10 DAYS TABLET ORAL
Qty: 20 | Refills: 0 | Status: DISCONTINUED | COMMUNITY
Start: 2020-03-17

## 2021-09-14 RX ORDER — METRONIDAZOLE 500 MG/1
TAKE 1 TABLET 3 TIMES DAILY X 14 DAYS TABLET ORAL
Qty: 42 | Refills: 0 | Status: DISCONTINUED | COMMUNITY
Start: 2020-06-18

## 2021-09-14 RX ORDER — BUSPIRONE HYDROCHLORIDE 15 MG/1
TABLET ORAL
Qty: 90 | Refills: 0 | Status: DISCONTINUED | COMMUNITY
Start: 2014-10-20

## 2021-09-14 RX ORDER — SIMVASTATIN 20 MG/1
TAKE 1 TABLET AT BEDTIME TABLET, FILM COATED ORAL
Refills: 0 | Status: ACTIVE | COMMUNITY
Start: 2018-06-19

## 2021-09-14 RX ORDER — HYDROCORTISONE ACETATE 25 MG/1
SUPPOSITORY RECTAL
Qty: 28 | Refills: 0 | Status: DISCONTINUED | COMMUNITY
Start: 2013-06-21

## 2021-09-14 RX ORDER — CODEINE PHOSPHATE AND GUAIFENESIN 10; 100 MG/5ML; MG/5ML
TK 10 ML PO QID PRF COUGH LIQUID ORAL
Qty: 180 | Refills: 0 | Status: DISCONTINUED | COMMUNITY
Start: 2020-03-17

## 2021-09-14 RX ORDER — DICLOFENAC POTASSIUM 50 MG/1
TABLET, FILM COATED ORAL
Qty: 30 | Refills: 0 | Status: DISCONTINUED | COMMUNITY
Start: 2017-11-08

## 2021-09-14 RX ORDER — IBUPROFEN 600 MG/1
TABLET ORAL
Qty: 30 | Refills: 0 | Status: DISCONTINUED | COMMUNITY
Start: 2014-11-05

## 2021-09-14 RX ORDER — CARISOPRODOL 350 MG/1
TABLET ORAL
Qty: 30 | Refills: 0 | Status: DISCONTINUED | COMMUNITY
Start: 2018-06-19

## 2021-09-14 RX ORDER — TRAMADOL HYDROCHLORIDE 50 MG/1
TABLET ORAL
Qty: 120 | Refills: 0 | Status: DISCONTINUED | COMMUNITY
Start: 2014-04-07

## 2021-09-14 RX ORDER — ONDANSETRON HYDROCHLORIDE 4 MG/1
TABLET, FILM COATED ORAL
Qty: 40 | Refills: 0 | Status: DISCONTINUED | COMMUNITY
Start: 2014-10-02

## 2021-09-14 RX ORDER — LORAZEPAM 1 MG/1
TK 1 T PO Q 8 H TABLET ORAL
Qty: 10 | Refills: 0 | Status: DISCONTINUED | COMMUNITY
Start: 2020-01-14

## 2021-09-14 RX ORDER — PROMETHAZINE HYDROCHLORIDE AND CODEINE PHOSPHATE 6.25; 1 MG/5ML; MG/5ML
TK 5 TO 10 ML PO Q 6 H PRF COUGH SOLUTION ORAL
Qty: 240 | Refills: 0 | Status: DISCONTINUED | COMMUNITY
Start: 2020-03-25

## 2021-09-14 RX ORDER — OMEPRAZOLE 40 MG/1
1 CAPSULE 30 MINUTES BEFORE MORNING MEAL CAPSULE, DELAYED RELEASE ORAL ONCE A DAY
Refills: 0 | Status: DISCONTINUED | COMMUNITY
Start: 2013-01-31

## 2021-09-14 RX ORDER — GABAPENTIN 100 MG/1
CAPSULE ORAL
Qty: 120 | Refills: 0 | Status: DISCONTINUED | COMMUNITY
Start: 2014-04-07

## 2021-09-14 RX ORDER — FOLIC ACID 1 MG/1
TAKE 1 TABLET DAILY TABLET ORAL
Refills: 0 | Status: ACTIVE | COMMUNITY

## 2021-09-14 NOTE — HPI-TODAY'S VISIT:
50-year-old female with a history of rheumatoid arthritis, hypothyroidism, constipation predominant irritable bowel syndrome, GERD, diverticulitis, presenting for follow-up after EGD. She was last seen 7/30/2021 for evaluation of postprandial epigastric pain with increasing GERD symptoms despite omeprazole 40 mg daily.  She was instructed to increase omeprazole to twice daily and an EGD was planned.  Her chronic constipation was managed with Linzess. The EGD was performed on 9/3/2021.  Findings included a normal hypopharynx, regular Z-line, gastroesophageal flap valve classified as Hill grade II (fold present, opens with respiration), nonerosive gastritis, normal examined duodenum. Pathology is not available for review. She was recommended a trial of Levsin. She complains of upper abdominal pain and nausea following most meals. She eats a few bites and feels full. She denies vomiting and has not experienced further GERD symptoms on omeprazole. She has a nonbloody stool most days with Linzess. On occasion, she will go 2-3 days without a stool. This will be followed by a few loose stools.

## 2021-09-27 ENCOUNTER — TELEPHONE ENCOUNTER (OUTPATIENT)
Dept: URBAN - METROPOLITAN AREA CLINIC 113 | Facility: CLINIC | Age: 51
End: 2021-09-27

## 2021-10-28 ENCOUNTER — OFFICE VISIT (OUTPATIENT)
Dept: URBAN - METROPOLITAN AREA CLINIC 113 | Facility: CLINIC | Age: 51
End: 2021-10-28

## 2021-11-16 ENCOUNTER — OFFICE VISIT (OUTPATIENT)
Dept: URBAN - METROPOLITAN AREA CLINIC 113 | Facility: CLINIC | Age: 51
End: 2021-11-16
Payer: COMMERCIAL

## 2021-11-16 VITALS
HEART RATE: 74 BPM | RESPIRATION RATE: 18 BRPM | BODY MASS INDEX: 26.43 KG/M2 | WEIGHT: 140 LBS | DIASTOLIC BLOOD PRESSURE: 75 MMHG | TEMPERATURE: 98.1 F | HEIGHT: 61 IN | SYSTOLIC BLOOD PRESSURE: 116 MMHG

## 2021-11-16 DIAGNOSIS — R10.13 EPIGASTRIC ABDOMINAL PAIN: ICD-10-CM

## 2021-11-16 DIAGNOSIS — R11.0 NAUSEA: ICD-10-CM

## 2021-11-16 DIAGNOSIS — K59.01 SLOW TRANSIT CONSTIPATION: ICD-10-CM

## 2021-11-16 PROCEDURE — 99214 OFFICE O/P EST MOD 30 MIN: CPT | Performed by: NURSE PRACTITIONER

## 2021-11-16 RX ORDER — ETANERCEPT 50 MG/ML
SOLUTION SUBCUTANEOUS
Qty: 392 | Refills: 0 | Status: ACTIVE | COMMUNITY
Start: 2017-12-05

## 2021-11-16 RX ORDER — HYOSCYAMINE SULFATE 0.12 MG/1
PLACE 1 TABLET UNDER THE TONGUE AND ALLOW TO DISSOLVE AS NEEDED FOR ABDOMINAL PAIN TABLET SUBLINGUAL
Qty: 45 | Refills: 1 | OUTPATIENT
Start: 2021-11-16 | End: 2021-12-06

## 2021-11-16 RX ORDER — OMEPRAZOLE 40 MG/1
1 CAPSULE TWICE DAILY, PRIOR TO BREAKFAST AND DINNER CAPSULE, DELAYED RELEASE ORAL TWICE DAILY
Qty: 180 | Refills: 3 | Status: ACTIVE | COMMUNITY
Start: 2021-07-30

## 2021-11-16 RX ORDER — METHOTREXATE 2.5 MG/1
TAKE 8 TABLETS PER WEEK TABLET ORAL
Refills: 0 | Status: ACTIVE | COMMUNITY

## 2021-11-16 RX ORDER — THYROID, PORCINE 60 MG/1
TAKE 1 TABLET DAILY TABLET ORAL
Refills: 0 | Status: ACTIVE | COMMUNITY
Start: 2019-02-12

## 2021-11-16 RX ORDER — SIMVASTATIN 20 MG/1
TAKE 1 TABLET AT BEDTIME TABLET, FILM COATED ORAL
Refills: 0 | Status: ACTIVE | COMMUNITY
Start: 2018-06-19

## 2021-11-16 RX ORDER — FOLIC ACID 1 MG/1
TAKE 1 TABLET DAILY TABLET ORAL
Refills: 0 | Status: ACTIVE | COMMUNITY

## 2021-11-16 RX ORDER — LIFITEGRAST 50 MG/ML
SOLUTION/ DROPS OPHTHALMIC
Qty: 180 | Refills: 0 | Status: ACTIVE | COMMUNITY
Start: 2018-01-05

## 2021-11-16 RX ORDER — LINACLOTIDE 290 UG/1
TAKE 1 CAPSULE BY MOUTH  DAILY BEFORE A MEAL CAPSULE, GELATIN COATED ORAL
Qty: 90 CAPSULE | Refills: 4 | Status: ACTIVE | COMMUNITY

## 2021-11-16 NOTE — HPI-OTHER HISTORIES
EGD was performed on 9/3/2021.  Findings included a normal hypopharynx, regular Z-line, gastroesophageal flap valve classified as Hill grade II (fold present, opens with respiration), nonerosive gastritis, normal examined duodenum. Antral biopsies showed chemical/reactive gastropathy, negative for H pylori.

## 2021-11-16 NOTE — HPI-TODAY'S VISIT:
51-year-old female with a history of rheumatoid arthritis, hypothyroidism, constipation predominant irritable bowel syndrome, GERD, diverticulitis, presenting for follow-up of abdominal pain. She was last seen 9/14/2021 for follow-up regarding postprandial epigastric pain, nausea, and early satiety, with persistent symptoms despite omeprazole.  Recent EGD was fairly unremarkable aside from mild gastritis.  A CT of the abdomen and pelvis was planned.  Regarding alternating bowel habits suspected to be related to underlying constipation, she was recommended a daily bowel regimen with Benefiber and Linzess with the addition of MiraLAX as needed.  Hyoscyamine was provided to use as needed for relief of her abdominal pain. Due to insurance barriers a CT of the abdomen with contrast was performed on 10/21/2021.  This showed fatty liver but otherwise no acute process.  Subsequent ultrasound of the pelvis 10/21/2021 revealed mildly prominent vaginal canal and cervix with mildly heterogenous echogenicity, prior hysterectomy, a 7 mm left ovarian follicular cyst.  Results were sent to her gynecologist, Dr. Little. Her symptoms are unchanged. She continues to experience upper abdominal pain and nausea following meals, which is worsened with overeating. She has not vomited. She denies reflux symptoms on omeprazole. She continues to experience alternating bowel habits, which are difficult to characterize. She goes 2-3 days without a bowel movement following by a bout of diarrhea which may last 1-2 days. Then the constipation recurs. She is taking fiber gummies daily along with Linzess. She has not tried MiraLAX. She denies blood per rectum.  She saw Dr. Little last week and had a pelvic exam;. She is unsure if any swabs were taken, however, she was recommended repeat pelvic US in 3 months.

## 2022-01-10 ENCOUNTER — ERX REFILL RESPONSE (OUTPATIENT)
Dept: URBAN - METROPOLITAN AREA CLINIC 113 | Facility: CLINIC | Age: 52
End: 2022-01-10

## 2022-01-10 RX ORDER — HYOSCYAMINE SULFATE 0.12 MG/1
PLACE 1 TABLET UNDER THE TONGUE AND ALLOW TO DISSOLVE EVERY 4 TO 6 HOURS AS NEEDED FOR ABDOMINAL PAIN TABLET SUBLINGUAL
Qty: 45 TABLET | Refills: 1 | OUTPATIENT

## 2022-01-10 RX ORDER — HYOSCYAMINE SULFATE 0.12 MG/1
PLACE 1 TABLET UNDER THE TONGUE AND ALLOW TO DISSOLVE EVERY 4 TO 6 HOURS AS NEEDED FOR ABDOMINAL PAIN TABLET SUBLINGUAL
Qty: 45 TABLET | Refills: 0 | OUTPATIENT

## 2022-02-17 ENCOUNTER — OFFICE VISIT (OUTPATIENT)
Dept: URBAN - METROPOLITAN AREA CLINIC 113 | Facility: CLINIC | Age: 52
End: 2022-02-17

## 2022-02-17 RX ORDER — SIMVASTATIN 20 MG/1
TAKE 1 TABLET AT BEDTIME TABLET, FILM COATED ORAL
Refills: 0 | Status: ACTIVE | COMMUNITY
Start: 2018-06-19

## 2022-02-17 RX ORDER — METHOTREXATE 2.5 MG/1
TAKE 8 TABLETS PER WEEK TABLET ORAL
Refills: 0 | Status: ACTIVE | COMMUNITY

## 2022-02-17 RX ORDER — THYROID, PORCINE 60 MG/1
TAKE 1 TABLET DAILY TABLET ORAL
Refills: 0 | Status: ACTIVE | COMMUNITY
Start: 2019-02-12

## 2022-02-17 RX ORDER — FOLIC ACID 1 MG/1
TAKE 1 TABLET DAILY TABLET ORAL
Refills: 0 | Status: ACTIVE | COMMUNITY

## 2022-02-17 RX ORDER — LINACLOTIDE 290 UG/1
TAKE 1 CAPSULE BY MOUTH  DAILY BEFORE A MEAL CAPSULE, GELATIN COATED ORAL
Qty: 90 CAPSULE | Refills: 4 | Status: ACTIVE | COMMUNITY

## 2022-02-17 RX ORDER — LIFITEGRAST 50 MG/ML
SOLUTION/ DROPS OPHTHALMIC
Qty: 180 | Refills: 0 | Status: ACTIVE | COMMUNITY
Start: 2018-01-05

## 2022-02-17 RX ORDER — OMEPRAZOLE 40 MG/1
1 CAPSULE TWICE DAILY, PRIOR TO BREAKFAST AND DINNER CAPSULE, DELAYED RELEASE ORAL TWICE DAILY
Qty: 180 | Refills: 3 | Status: ACTIVE | COMMUNITY
Start: 2021-07-30

## 2022-02-17 RX ORDER — ETANERCEPT 50 MG/ML
SOLUTION SUBCUTANEOUS
Qty: 392 | Refills: 0 | Status: ACTIVE | COMMUNITY
Start: 2017-12-05

## 2022-02-17 RX ORDER — HYOSCYAMINE SULFATE 0.12 MG/1
PLACE 1 TABLET UNDER THE TONGUE AND ALLOW TO DISSOLVE EVERY 4 TO 6 HOURS AS NEEDED FOR ABDOMINAL PAIN TABLET SUBLINGUAL
Qty: 45 TABLET | Refills: 1 | Status: ACTIVE | COMMUNITY

## 2022-03-04 ENCOUNTER — OFFICE VISIT (OUTPATIENT)
Dept: URBAN - METROPOLITAN AREA CLINIC 113 | Facility: CLINIC | Age: 52
End: 2022-03-04
Payer: COMMERCIAL

## 2022-03-04 VITALS
HEIGHT: 61 IN | RESPIRATION RATE: 18 BRPM | HEART RATE: 71 BPM | WEIGHT: 141 LBS | SYSTOLIC BLOOD PRESSURE: 109 MMHG | TEMPERATURE: 98.1 F | BODY MASS INDEX: 26.62 KG/M2 | DIASTOLIC BLOOD PRESSURE: 67 MMHG

## 2022-03-04 DIAGNOSIS — Z86.010 HISTORY OF COLON POLYPS: ICD-10-CM

## 2022-03-04 DIAGNOSIS — K58.1 IRRITABLE BOWEL SYNDROME WITH CONSTIPATION: ICD-10-CM

## 2022-03-04 DIAGNOSIS — K59.01 SLOW TRANSIT CONSTIPATION: ICD-10-CM

## 2022-03-04 PROBLEM — 35298007 SLOW TRANSIT CONSTIPATION: Status: ACTIVE | Noted: 2021-07-30

## 2022-03-04 PROBLEM — 440630006 IRRITABLE BOWEL SYNDROME CHARACTERIZED BY CONSTIPATION: Status: ACTIVE | Noted: 2022-03-04

## 2022-03-04 PROCEDURE — 99214 OFFICE O/P EST MOD 30 MIN: CPT | Performed by: NURSE PRACTITIONER

## 2022-03-04 RX ORDER — POLYETHYLENE GLYCOL 3350, SODIUM CHLORIDE, SODIUM BICARBONATE AND POTASSIUM CHLORIDE 420G
AS DIRECTED KIT ORAL ONCE
Qty: 420 GRAM | Refills: 0 | OUTPATIENT
Start: 2022-03-04 | End: 2022-03-05

## 2022-03-04 RX ORDER — HYOSCYAMINE SULFATE 0.12 MG/1
PLACE 1 TABLET UNDER THE TONGUE AND ALLOW TO DISSOLVE EVERY 4 TO 6 HOURS AS NEEDED FOR ABDOMINAL PAIN TABLET SUBLINGUAL
Qty: 45 TABLET | Refills: 1 | Status: ACTIVE | COMMUNITY

## 2022-03-04 RX ORDER — THYROID, PORCINE 60 MG/1
TAKE 1 TABLET DAILY TABLET ORAL
Refills: 0 | Status: ACTIVE | COMMUNITY
Start: 2019-02-12

## 2022-03-04 RX ORDER — SIMVASTATIN 20 MG/1
TAKE 1 TABLET AT BEDTIME TABLET, FILM COATED ORAL
Refills: 0 | Status: ACTIVE | COMMUNITY
Start: 2018-06-19

## 2022-03-04 RX ORDER — ETANERCEPT 50 MG/ML
SOLUTION SUBCUTANEOUS
Qty: 392 | Refills: 0 | Status: ACTIVE | COMMUNITY
Start: 2017-12-05

## 2022-03-04 RX ORDER — DICYCLOMINE HYDROCHLORIDE 10 MG/1
1 CAPSULE CAPSULE ORAL
Qty: 60 | Refills: 1 | OUTPATIENT
Start: 2022-03-04 | End: 2022-05-03

## 2022-03-04 RX ORDER — LINACLOTIDE 290 UG/1
TAKE 1 CAPSULE BY MOUTH  DAILY BEFORE A MEAL CAPSULE, GELATIN COATED ORAL
Qty: 90 CAPSULE | Refills: 4 | Status: ACTIVE | COMMUNITY

## 2022-03-04 RX ORDER — FOLIC ACID 1 MG/1
TAKE 1 TABLET DAILY TABLET ORAL
Refills: 0 | Status: ACTIVE | COMMUNITY

## 2022-03-04 RX ORDER — METHOTREXATE 2.5 MG/1
TAKE 8 TABLETS PER WEEK TABLET ORAL
Refills: 0 | Status: ACTIVE | COMMUNITY

## 2022-03-04 RX ORDER — LIFITEGRAST 50 MG/ML
SOLUTION/ DROPS OPHTHALMIC
Qty: 180 | Refills: 0 | Status: ACTIVE | COMMUNITY
Start: 2018-01-05

## 2022-03-04 RX ORDER — OMEPRAZOLE 40 MG/1
1 CAPSULE TWICE DAILY, PRIOR TO BREAKFAST AND DINNER CAPSULE, DELAYED RELEASE ORAL TWICE DAILY
Qty: 180 | Refills: 3 | Status: ACTIVE | COMMUNITY
Start: 2021-07-30

## 2022-03-04 NOTE — HPI-TODAY'S VISIT:
51-year-old female with a history of rheumatoid arthritis, hypothyroidism, constipation predominant irritable bowel syndrome, GERD, diverticulitis, presenting for follow-up. She was last seen 11/16/2021 for follow-up of persistent postprandial upper abdominal pain and nausea suspected to be related to underlying constipation with a possible component of delayed gastric emptying.  She was encouraged a low residue diet.  She was instructed to continue Linzess 290 mcg daily with the addition of Benefiber and milk of magnesia.  Hyoscyamine was provided to use as needed for symptomatic relief. She continues to experience occasional bouts of lower abdominal pain but nausea has subsided. She has a nonbloody stool every 2-3 days, but admits to varying consistency. She complains of hard stools and inability to defecate followed by several episodes of diarrhea. She remains on Linzess and fiber but has not tried MOM. Her last colonoscopy was in 2018 and was notable for the removal of polyps; repeat colonoscopy was recommended in 3 years. She would like to schedule. Of note, Levsin has been helpful for her abdominal pain, but her insurance will not cover the medication.

## 2022-03-04 NOTE — HPI-OTHER HISTORIES
CT of the abdomen with contrast was performed on 10/21/2021.  This showed fatty liver but otherwise no acute process.  Subsequent ultrasound of the pelvis 10/21/2021 revealed mildly prominent vaginal canal and cervix with mildly heterogenous echogenicity, prior hysterectomy, a 7 mm left ovarian follicular cyst.  Results were sent to her gynecologist, Dr. Little. EGD was performed on 9/3/2021.  Findings included a normal hypopharynx, regular Z-line, gastroesophageal flap valve classified as Hill grade II (fold present, opens with respiration), nonerosive gastritis, normal examined duodenum. Antral biopsies showed chemical/reactive gastropathy, negative for H pylori.

## 2022-04-13 PROBLEM — 428283002 HISTORY OF POLYP OF COLON: Status: ACTIVE | Noted: 2022-03-04

## 2022-04-26 ENCOUNTER — OFFICE VISIT (OUTPATIENT)
Dept: URBAN - METROPOLITAN AREA SURGERY CENTER 25 | Facility: SURGERY CENTER | Age: 52
End: 2022-04-26
Payer: COMMERCIAL

## 2022-04-26 DIAGNOSIS — Z86.010 ADENOMAS PERSONAL HISTORY OF COLONIC POLYPS: ICD-10-CM

## 2022-04-26 PROCEDURE — G0105 COLORECTAL SCRN; HI RISK IND: HCPCS | Performed by: INTERNAL MEDICINE

## 2022-04-26 PROCEDURE — G8907 PT DOC NO EVENTS ON DISCHARG: HCPCS | Performed by: INTERNAL MEDICINE

## 2022-04-26 RX ORDER — LIFITEGRAST 50 MG/ML
SOLUTION/ DROPS OPHTHALMIC
Qty: 180 | Refills: 0 | Status: ACTIVE | COMMUNITY
Start: 2018-01-05

## 2022-04-26 RX ORDER — SIMVASTATIN 20 MG/1
TAKE 1 TABLET AT BEDTIME TABLET, FILM COATED ORAL
Refills: 0 | Status: ACTIVE | COMMUNITY
Start: 2018-06-19

## 2022-04-26 RX ORDER — DICYCLOMINE HYDROCHLORIDE 10 MG/1
1 CAPSULE CAPSULE ORAL
Qty: 60 | Refills: 1 | Status: ACTIVE | COMMUNITY
Start: 2022-03-04 | End: 2022-05-03

## 2022-04-26 RX ORDER — METHOTREXATE 2.5 MG/1
TAKE 8 TABLETS PER WEEK TABLET ORAL
Refills: 0 | Status: ACTIVE | COMMUNITY

## 2022-04-26 RX ORDER — HYOSCYAMINE SULFATE 0.12 MG/1
PLACE 1 TABLET UNDER THE TONGUE AND ALLOW TO DISSOLVE EVERY 4 TO 6 HOURS AS NEEDED FOR ABDOMINAL PAIN TABLET SUBLINGUAL
Qty: 45 TABLET | Refills: 1 | Status: ACTIVE | COMMUNITY

## 2022-04-26 RX ORDER — FOLIC ACID 1 MG/1
TAKE 1 TABLET DAILY TABLET ORAL
Refills: 0 | Status: ACTIVE | COMMUNITY

## 2022-04-26 RX ORDER — ETANERCEPT 50 MG/ML
SOLUTION SUBCUTANEOUS
Qty: 392 | Refills: 0 | Status: ACTIVE | COMMUNITY
Start: 2017-12-05

## 2022-04-26 RX ORDER — THYROID, PORCINE 60 MG/1
TAKE 1 TABLET DAILY TABLET ORAL
Refills: 0 | Status: ACTIVE | COMMUNITY
Start: 2019-02-12

## 2022-04-26 RX ORDER — LINACLOTIDE 290 UG/1
TAKE 1 CAPSULE BY MOUTH  DAILY BEFORE A MEAL CAPSULE, GELATIN COATED ORAL
Qty: 90 CAPSULE | Refills: 4 | Status: ACTIVE | COMMUNITY

## 2022-04-26 RX ORDER — OMEPRAZOLE 40 MG/1
1 CAPSULE TWICE DAILY, PRIOR TO BREAKFAST AND DINNER CAPSULE, DELAYED RELEASE ORAL TWICE DAILY
Qty: 180 | Refills: 3 | Status: ACTIVE | COMMUNITY
Start: 2021-07-30

## 2022-10-17 ENCOUNTER — ERX REFILL RESPONSE (OUTPATIENT)
Dept: URBAN - METROPOLITAN AREA CLINIC 113 | Facility: CLINIC | Age: 52
End: 2022-10-17

## 2022-10-17 RX ORDER — LINACLOTIDE 290 UG/1
TAKE 1 CAPSULE BY MOUTH DAILY BEFORE A MEAL CAPSULE, GELATIN COATED ORAL
Qty: 90 CAPSULE | Refills: 4 | OUTPATIENT

## 2022-10-17 RX ORDER — LINACLOTIDE 290 UG/1
TAKE 1 CAPSULE BY MOUTH DAILY BEFORE A MEAL CAPSULE, GELATIN COATED ORAL
Qty: 90 CAPSULE | Refills: 3 | OUTPATIENT

## 2023-10-31 ENCOUNTER — TELEPHONE ENCOUNTER (OUTPATIENT)
Dept: URBAN - METROPOLITAN AREA CLINIC 113 | Facility: CLINIC | Age: 53
End: 2023-10-31

## 2023-10-31 RX ORDER — LINACLOTIDE 290 UG/1
TAKE 1 CAPSULE BY MOUTH DAILY BEFORE A MEAL CAPSULE, GELATIN COATED ORAL
Qty: 90 CAPSULE | Refills: 3

## 2023-11-27 ENCOUNTER — OFFICE VISIT (OUTPATIENT)
Dept: URBAN - METROPOLITAN AREA CLINIC 113 | Facility: CLINIC | Age: 53
End: 2023-11-27
Payer: COMMERCIAL

## 2023-11-27 VITALS
SYSTOLIC BLOOD PRESSURE: 104 MMHG | HEIGHT: 61 IN | TEMPERATURE: 96.8 F | HEART RATE: 79 BPM | WEIGHT: 143 LBS | BODY MASS INDEX: 27 KG/M2 | DIASTOLIC BLOOD PRESSURE: 72 MMHG | RESPIRATION RATE: 18 BRPM

## 2023-11-27 DIAGNOSIS — K58.1 IRRITABLE BOWEL SYNDROME WITH CONSTIPATION: ICD-10-CM

## 2023-11-27 PROCEDURE — 99214 OFFICE O/P EST MOD 30 MIN: CPT | Performed by: NURSE PRACTITIONER

## 2023-11-27 RX ORDER — HYOSCYAMINE SULFATE 0.12 MG/1
PLACE 1 TABLET UNDER THE TONGUE AND ALLOW TO DISSOLVE EVERY 4 TO 6 HOURS AS NEEDED FOR ABDOMINAL PAIN TABLET SUBLINGUAL
Qty: 45 TABLET | Refills: 1 | Status: ACTIVE | COMMUNITY

## 2023-11-27 RX ORDER — METHOTREXATE 2.5 MG/1
TAKE 8 TABLETS PER WEEK TABLET ORAL
Refills: 0 | Status: ACTIVE | COMMUNITY

## 2023-11-27 RX ORDER — TENAPANOR HYDROCHLORIDE 53.2 MG/1
1 TABLET PRIOR TO BREAKFAST AND DINNER TABLET ORAL TWICE A DAY
Qty: 180 TABLET | Refills: 3 | OUTPATIENT
Start: 2023-11-27 | End: 2024-11-21

## 2023-11-27 RX ORDER — THYROID, PORCINE 60 MG/1
TAKE 1 TABLET DAILY TABLET ORAL
Refills: 0 | Status: ACTIVE | COMMUNITY
Start: 2019-02-12

## 2023-11-27 RX ORDER — LINACLOTIDE 290 UG/1
TAKE 1 CAPSULE BY MOUTH DAILY BEFORE A MEAL CAPSULE, GELATIN COATED ORAL
Qty: 90 CAPSULE | Refills: 3 | Status: ACTIVE | COMMUNITY

## 2023-11-27 RX ORDER — OMEPRAZOLE 40 MG/1
1 CAPSULE TWICE DAILY, PRIOR TO BREAKFAST AND DINNER CAPSULE, DELAYED RELEASE ORAL TWICE DAILY
Qty: 180 | Refills: 3 | Status: ACTIVE | COMMUNITY
Start: 2021-07-30

## 2023-11-27 RX ORDER — ETANERCEPT 50 MG/ML
SOLUTION SUBCUTANEOUS
Qty: 392 | Refills: 0 | Status: ON HOLD | COMMUNITY
Start: 2017-12-05

## 2023-11-27 RX ORDER — FOLIC ACID 1 MG/1
TAKE 1 TABLET DAILY TABLET ORAL
Refills: 0 | Status: ACTIVE | COMMUNITY

## 2023-11-27 RX ORDER — SIMVASTATIN 20 MG/1
TAKE 1 TABLET AT BEDTIME TABLET, FILM COATED ORAL
Refills: 0 | Status: ACTIVE | COMMUNITY
Start: 2018-06-19

## 2023-11-27 RX ORDER — LIFITEGRAST 50 MG/ML
SOLUTION/ DROPS OPHTHALMIC
Qty: 180 | Refills: 0 | Status: ACTIVE | COMMUNITY
Start: 2018-01-05

## 2023-11-27 RX ORDER — UPADACITINIB 15 MG/1
1 TABLET TABLET, EXTENDED RELEASE ORAL ONCE A DAY
Status: ACTIVE | COMMUNITY

## 2023-11-27 NOTE — HPI-TODAY'S VISIT:
53-year-old female with a history of rheumatoid arthritis on Rinvoq, hypothyroidism, constipation predominant irritable bowel syndrome, GERD, diverticulitis, presenting for follow-up. She was last seen in the office in March 2022 for follow-up regarding chronic abdominal pain and constipation attributes to irritable bowel syndrome. She was recommended a bowel clease with magnesium citrate and was to continue Linzess with the addition of MiraLAX as needed. Prior CT showed no acute process. A colonoscopy was planned for polyp surveillance. Colonoscopy 4/26/22: mild ascending colon diverticulosis, nonbleeding internal hemorrhoids. A repeat colonoscopy was recommended in 10 years for screening. Her constipation had been fairly well managed with Linzess and MiraLAX, until she was started on Rinvoq about 2.5 month ago. She is having a bowel movement every few days. She complains of some lower abdominal discomfort and difficulty with evacuation. She has infrequent red blood on the toilet paper with wiping. She has tried various OTC therapies, including MiraLAX, with little relief.

## 2023-11-28 ENCOUNTER — TELEPHONE ENCOUNTER (OUTPATIENT)
Dept: URBAN - METROPOLITAN AREA CLINIC 113 | Facility: CLINIC | Age: 53
End: 2023-11-28

## 2023-11-29 ENCOUNTER — TELEPHONE ENCOUNTER (OUTPATIENT)
Dept: URBAN - METROPOLITAN AREA CLINIC 113 | Facility: CLINIC | Age: 53
End: 2023-11-29

## 2024-02-27 ENCOUNTER — LAB (OUTPATIENT)
Dept: URBAN - METROPOLITAN AREA CLINIC 113 | Facility: CLINIC | Age: 54
End: 2024-02-27

## 2024-02-27 ENCOUNTER — OV EP (OUTPATIENT)
Dept: URBAN - METROPOLITAN AREA CLINIC 113 | Facility: CLINIC | Age: 54
End: 2024-02-27
Payer: COMMERCIAL

## 2024-02-27 VITALS
BODY MASS INDEX: 25.57 KG/M2 | TEMPERATURE: 97.8 F | HEIGHT: 61 IN | WEIGHT: 135.4 LBS | DIASTOLIC BLOOD PRESSURE: 86 MMHG | HEART RATE: 80 BPM | RESPIRATION RATE: 18 BRPM | SYSTOLIC BLOOD PRESSURE: 129 MMHG

## 2024-02-27 DIAGNOSIS — R68.81 EARLY SATIETY: ICD-10-CM

## 2024-02-27 DIAGNOSIS — R63.4 WEIGHT LOSS, UNINTENTIONAL: ICD-10-CM

## 2024-02-27 DIAGNOSIS — R10.13 EPIGASTRIC ABDOMINAL PAIN: ICD-10-CM

## 2024-02-27 DIAGNOSIS — K58.1 IRRITABLE BOWEL SYNDROME WITH CONSTIPATION: ICD-10-CM

## 2024-02-27 PROCEDURE — 99214 OFFICE O/P EST MOD 30 MIN: CPT | Performed by: NURSE PRACTITIONER

## 2024-02-27 RX ORDER — LINACLOTIDE 290 UG/1
TAKE 1 CAPSULE BY MOUTH DAILY BEFORE A MEAL CAPSULE, GELATIN COATED ORAL
Qty: 90 CAPSULE | Refills: 3 | Status: ON HOLD | COMMUNITY

## 2024-02-27 RX ORDER — OMEPRAZOLE 40 MG/1
1 CAPSULE TWICE DAILY, PRIOR TO BREAKFAST AND DINNER CAPSULE, DELAYED RELEASE ORAL TWICE DAILY
Qty: 180 | Refills: 3 | OUTPATIENT
Start: 2024-02-27

## 2024-02-27 RX ORDER — UPADACITINIB 15 MG/1
1 TABLET TABLET, EXTENDED RELEASE ORAL ONCE A DAY
Status: ON HOLD | COMMUNITY

## 2024-02-27 RX ORDER — HYOSCYAMINE SULFATE 0.12 MG/1
PLACE 1 TABLET UNDER THE TONGUE AND ALLOW TO DISSOLVE EVERY 4 TO 6 HOURS AS NEEDED FOR ABDOMINAL PAIN TABLET SUBLINGUAL
Qty: 45 TABLET | Refills: 1 | Status: ACTIVE | COMMUNITY

## 2024-02-27 RX ORDER — SIMVASTATIN 20 MG/1
TAKE 1 TABLET AT BEDTIME TABLET, FILM COATED ORAL
Refills: 0 | Status: ACTIVE | COMMUNITY
Start: 2018-06-19

## 2024-02-27 RX ORDER — FOLIC ACID 1 MG/1
TAKE 1 TABLET DAILY TABLET ORAL
Refills: 0 | Status: ACTIVE | COMMUNITY

## 2024-02-27 RX ORDER — OMEPRAZOLE 40 MG/1
1 CAPSULE 30 MINUTES BEFORE MORNING MEAL CAPSULE, DELAYED RELEASE ORAL ONCE A DAY
Refills: 3 | Status: ACTIVE | COMMUNITY
Start: 2021-07-30

## 2024-02-27 RX ORDER — METHOTREXATE 2.5 MG/1
TAKE 8 TABLETS PER WEEK TABLET ORAL
Refills: 0 | Status: ACTIVE | COMMUNITY

## 2024-02-27 RX ORDER — OMEGA-3/DHA/EPA/FISH OIL 120-180 MG
1 CAPSULE CAPSULE ORAL ONCE A DAY
Status: ACTIVE | COMMUNITY

## 2024-02-27 RX ORDER — ETANERCEPT 50 MG/ML
SOLUTION SUBCUTANEOUS
Qty: 392 | Refills: 0 | Status: ON HOLD | COMMUNITY
Start: 2017-12-05

## 2024-02-27 RX ORDER — LIFITEGRAST 50 MG/ML
SOLUTION/ DROPS OPHTHALMIC
Qty: 180 | Refills: 0 | Status: ACTIVE | COMMUNITY
Start: 2018-01-05

## 2024-02-27 RX ORDER — TENAPANOR HYDROCHLORIDE 53.2 MG/1
1 TABLET PRIOR TO BREAKFAST AND DINNER TABLET ORAL TWICE A DAY
OUTPATIENT
Start: 2023-11-27

## 2024-02-27 RX ORDER — TOCILIZUMAB 180 MG/ML
0.9 ML INJECTION, SOLUTION SUBCUTANEOUS
Status: ACTIVE | COMMUNITY

## 2024-02-27 RX ORDER — TENAPANOR HYDROCHLORIDE 53.2 MG/1
1 TABLET PRIOR TO BREAKFAST AND DINNER TABLET ORAL TWICE A DAY
Qty: 180 TABLET | Refills: 3 | Status: ACTIVE | COMMUNITY
Start: 2023-11-27 | End: 2024-11-21

## 2024-02-27 RX ORDER — THYROID, PORCINE 60 MG/1
TAKE 1 TABLET DAILY TABLET ORAL
Refills: 0 | Status: ACTIVE | COMMUNITY
Start: 2019-02-12

## 2024-02-27 NOTE — HPI-OTHER HISTORIES
Colonoscopy 4/26/22: mild ascending colon diverticulosis, nonbleeding internal hemorrhoids. A repeat colonoscopy was recommended in 10 years for screening. CT of the abdomen with contrast was performed on 10/21/2021.  This showed fatty liver but otherwise no acute process.  Subsequent ultrasound of the pelvis 10/21/2021 revealed mildly prominent vaginal canal and cervix with mildly heterogenous echogenicity, prior hysterectomy, a 7 mm left ovarian follicular cyst.  Results were sent to her gynecologist, Dr. Little. EGD was performed on 9/3/2021.  Findings included a normal hypopharynx, regular Z-line, gastroesophageal flap valve classified as Hill grade II (fold present, opens with respiration), nonerosive gastritis, normal examined duodenum. Antral biopsies showed chemical/reactive gastropathy, negative for H pylori.

## 2024-02-27 NOTE — HPI-TODAY'S VISIT:
53-year-old female with a history of rheumatoid arthritis (Dr. Jo), hypothyroidism, constipation predominant irritable bowel syndrome, GERD, diverticulitis, presenting for follow-up. She was seen in the office in November for follow-up regarding IBS-C.  Her symptoms were suboptimally managed with Linzess and various OTC therapies have previously been ineffective.  She was recommended a bowel cleanse followed by initiation of Ibsrela. Fortunately, her constipation has improved.  She is having a daily bowel movement with Ibsrela.  Stools will be loose on occasion.  No diarrhea.  Within the last 2 months, she has experienced intermittent burning epigastric pain which is worsened following meals and with lying down at night.  She complains of a decrease in appetite, nausea, and early satiety.  She has not vomited.  She denies any heartburn or regurgitation, on omeprazole once daily.  No NSAID use.  She has lost almost 10 pounds unintentionally since the time of her last office visit.  She states she avoids eating due to the abdominal discomfort.

## 2024-05-28 ENCOUNTER — OFFICE VISIT (OUTPATIENT)
Dept: URBAN - METROPOLITAN AREA CLINIC 113 | Facility: CLINIC | Age: 54
End: 2024-05-28

## 2024-06-06 ENCOUNTER — OFFICE VISIT (OUTPATIENT)
Dept: URBAN - METROPOLITAN AREA CLINIC 113 | Facility: CLINIC | Age: 54
End: 2024-06-06

## 2024-06-21 ENCOUNTER — OFFICE VISIT (OUTPATIENT)
Dept: URBAN - METROPOLITAN AREA CLINIC 113 | Facility: CLINIC | Age: 54
End: 2024-06-21
Payer: COMMERCIAL

## 2024-06-21 ENCOUNTER — DASHBOARD ENCOUNTERS (OUTPATIENT)
Age: 54
End: 2024-06-21

## 2024-06-21 VITALS
BODY MASS INDEX: 27.23 KG/M2 | DIASTOLIC BLOOD PRESSURE: 79 MMHG | TEMPERATURE: 97.9 F | HEART RATE: 77 BPM | HEIGHT: 61 IN | SYSTOLIC BLOOD PRESSURE: 99 MMHG | RESPIRATION RATE: 18 BRPM | WEIGHT: 144.2 LBS

## 2024-06-21 DIAGNOSIS — K58.1 IRRITABLE BOWEL SYNDROME WITH CONSTIPATION: ICD-10-CM

## 2024-06-21 DIAGNOSIS — K21.9 GERD: ICD-10-CM

## 2024-06-21 DIAGNOSIS — R10.13 EPIGASTRIC ABDOMINAL PAIN: ICD-10-CM

## 2024-06-21 PROCEDURE — 99214 OFFICE O/P EST MOD 30 MIN: CPT | Performed by: NURSE PRACTITIONER

## 2024-06-21 RX ORDER — FOLIC ACID 1 MG/1
TAKE 1 TABLET DAILY TABLET ORAL
Refills: 0 | Status: ACTIVE | COMMUNITY

## 2024-06-21 RX ORDER — TOCILIZUMAB 180 MG/ML
0.9 ML INJECTION, SOLUTION SUBCUTANEOUS
Status: ACTIVE | COMMUNITY

## 2024-06-21 RX ORDER — METHOTREXATE 2.5 MG/1
TAKE 8 TABLETS PER WEEK TABLET ORAL
Refills: 0 | Status: ACTIVE | COMMUNITY

## 2024-06-21 RX ORDER — OMEPRAZOLE 40 MG/1
1 CAPSULE TWICE DAILY, PRIOR TO BREAKFAST AND DINNER CAPSULE, DELAYED RELEASE ORAL TWICE DAILY
Qty: 180 | Refills: 3 | Status: ACTIVE | COMMUNITY
Start: 2024-02-27

## 2024-06-21 RX ORDER — SIMVASTATIN 20 MG/1
TAKE 1 TABLET AT BEDTIME TABLET, FILM COATED ORAL
Refills: 0 | Status: ACTIVE | COMMUNITY
Start: 2018-06-19

## 2024-06-21 RX ORDER — THYROID, PORCINE 60 MG/1
TAKE 1 TABLET DAILY TABLET ORAL
Refills: 0 | Status: ACTIVE | COMMUNITY
Start: 2019-02-12

## 2024-06-21 RX ORDER — OMEPRAZOLE 40 MG/1
1 CAPSULE TWICE DAILY, PRIOR TO BREAKFAST AND DINNER CAPSULE, DELAYED RELEASE ORAL TWICE DAILY
OUTPATIENT
Start: 2024-02-27

## 2024-06-21 RX ORDER — HYOSCYAMINE SULFATE 0.12 MG/1
PLACE 1 TABLET UNDER THE TONGUE AND ALLOW TO DISSOLVE EVERY 4 TO 6 HOURS AS NEEDED FOR ABDOMINAL PAIN TABLET SUBLINGUAL
Qty: 45 TABLET | Refills: 1 | Status: ACTIVE | COMMUNITY

## 2024-06-21 RX ORDER — TENAPANOR HYDROCHLORIDE 53.2 MG/1
1 TABLET PRIOR TO BREAKFAST AND DINNER TABLET ORAL TWICE A DAY
Status: ACTIVE | COMMUNITY
Start: 2023-11-27

## 2024-06-21 RX ORDER — UPADACITINIB 15 MG/1
1 TABLET TABLET, EXTENDED RELEASE ORAL ONCE A DAY
Status: ON HOLD | COMMUNITY

## 2024-06-21 RX ORDER — HYOSCYAMINE SULFATE 0.12 MG/1
PLACE 1 TABLET UNDER THE TONGUE AND ALLOW TO DISSOLVE EVERY 4 TO 6 HOURS AS NEEDED FOR ABDOMINAL PAIN TABLET SUBLINGUAL
OUTPATIENT

## 2024-06-21 RX ORDER — LIFITEGRAST 50 MG/ML
SOLUTION/ DROPS OPHTHALMIC
Qty: 180 | Refills: 0 | Status: ACTIVE | COMMUNITY
Start: 2018-01-05

## 2024-06-21 RX ORDER — ETANERCEPT 50 MG/ML
SOLUTION SUBCUTANEOUS
Qty: 392 | Refills: 0 | Status: ON HOLD | COMMUNITY
Start: 2017-12-05

## 2024-06-21 RX ORDER — OMEGA-3/DHA/EPA/FISH OIL 120-180 MG
1 CAPSULE CAPSULE ORAL ONCE A DAY
Status: ACTIVE | COMMUNITY

## 2024-06-21 RX ORDER — LINACLOTIDE 290 UG/1
TAKE 1 CAPSULE BY MOUTH DAILY BEFORE A MEAL CAPSULE, GELATIN COATED ORAL
Qty: 90 CAPSULE | Refills: 3 | Status: ON HOLD | COMMUNITY

## 2024-06-21 RX ORDER — TENAPANOR HYDROCHLORIDE 53.2 MG/1
1 TABLET PRIOR TO BREAKFAST AND DINNER TABLET ORAL TWICE A DAY
OUTPATIENT
Start: 2023-11-27

## 2024-06-21 NOTE — HPI-TODAY'S VISIT:
53-year-old female with a history of rheumatoid arthritis (Dr. Jo), hypothyroidism, constipation predominant irritable bowel syndrome, GERD, diverticulitis, presenting for follow-up.  She was seen in the office in February for evaluation of a recent exacerbation of epigastric pain, early satiety and weight loss. Recent labs showed a normal hemoglobin, LFTs, and CRP.  A CT of the abdomen and pelvis and upper endoscopy were planned. She was to increase omeprazole to twice daily for hopeful therapeutic relief. Regarding IBS-C, her symptoms were managed with Ibsrela.  CT a/p with contrast 3/6/24: no acute process. EGD was not performed.  Overall, her symptoms have improved. She has infrequent upper abdominal pain, about every two weeks, which responds to hyoscyamine when needed. She denies any reflux symptoms on twice daily omeprazole. No nausea or vomiting. She has a bowel movement every couple of days with Ibsrela. No blood in the stool. Her weight has improved.

## 2024-09-05 ENCOUNTER — LAB OUTSIDE AN ENCOUNTER (OUTPATIENT)
Dept: URBAN - METROPOLITAN AREA CLINIC 113 | Facility: CLINIC | Age: 54
End: 2024-09-05

## 2024-09-05 ENCOUNTER — OFFICE VISIT (OUTPATIENT)
Dept: URBAN - METROPOLITAN AREA CLINIC 113 | Facility: CLINIC | Age: 54
End: 2024-09-05
Payer: COMMERCIAL

## 2024-09-05 VITALS
WEIGHT: 144 LBS | HEART RATE: 69 BPM | SYSTOLIC BLOOD PRESSURE: 126 MMHG | DIASTOLIC BLOOD PRESSURE: 76 MMHG | BODY MASS INDEX: 27.19 KG/M2 | RESPIRATION RATE: 18 BRPM | HEIGHT: 61 IN | TEMPERATURE: 98.1 F

## 2024-09-05 DIAGNOSIS — K21.9 GERD: ICD-10-CM

## 2024-09-05 DIAGNOSIS — R11.2 NAUSEA AND VOMITING: ICD-10-CM

## 2024-09-05 DIAGNOSIS — K58.1 IRRITABLE BOWEL SYNDROME WITH CONSTIPATION: ICD-10-CM

## 2024-09-05 DIAGNOSIS — R68.81 EARLY SATIETY: ICD-10-CM

## 2024-09-05 PROCEDURE — 99214 OFFICE O/P EST MOD 30 MIN: CPT | Performed by: NURSE PRACTITIONER

## 2024-09-05 RX ORDER — TENAPANOR HYDROCHLORIDE 53.2 MG/1
1 TABLET PRIOR TO BREAKFAST AND DINNER TABLET ORAL TWICE A DAY
Status: ACTIVE | COMMUNITY
Start: 2023-11-27

## 2024-09-05 RX ORDER — FOLIC ACID 1 MG/1
TAKE 1 TABLET DAILY TABLET ORAL
Refills: 0 | Status: ACTIVE | COMMUNITY

## 2024-09-05 RX ORDER — UPADACITINIB 15 MG/1
1 TABLET TABLET, EXTENDED RELEASE ORAL ONCE A DAY
Status: ON HOLD | COMMUNITY

## 2024-09-05 RX ORDER — OMEPRAZOLE 40 MG/1
1 CAPSULE TWICE DAILY, PRIOR TO BREAKFAST AND DINNER CAPSULE, DELAYED RELEASE ORAL TWICE DAILY
Status: ACTIVE | COMMUNITY
Start: 2024-02-27

## 2024-09-05 RX ORDER — TENAPANOR HYDROCHLORIDE 53.2 MG/1
1 TABLET PRIOR TO BREAKFAST AND DINNER TABLET ORAL TWICE A DAY
OUTPATIENT
Start: 2023-11-27

## 2024-09-05 RX ORDER — OMEPRAZOLE 40 MG/1
1 CAPSULE TWICE DAILY, PRIOR TO BREAKFAST AND DINNER CAPSULE, DELAYED RELEASE ORAL TWICE DAILY
OUTPATIENT
Start: 2024-02-27

## 2024-09-05 RX ORDER — LINACLOTIDE 290 UG/1
TAKE 1 CAPSULE BY MOUTH DAILY BEFORE A MEAL CAPSULE, GELATIN COATED ORAL
Qty: 90 CAPSULE | Refills: 3 | Status: ON HOLD | COMMUNITY

## 2024-09-05 RX ORDER — METHOTREXATE 2.5 MG/1
TAKE 8 TABLETS PER WEEK TABLET ORAL
Refills: 0 | Status: ACTIVE | COMMUNITY

## 2024-09-05 RX ORDER — OMEGA-3/DHA/EPA/FISH OIL 120-180 MG
1 CAPSULE CAPSULE ORAL ONCE A DAY
Status: ACTIVE | COMMUNITY

## 2024-09-05 RX ORDER — HYOSCYAMINE SULFATE 0.12 MG/1
PLACE 1 TABLET UNDER THE TONGUE AND ALLOW TO DISSOLVE EVERY 4 TO 6 HOURS AS NEEDED FOR ABDOMINAL PAIN TABLET SUBLINGUAL
OUTPATIENT

## 2024-09-05 RX ORDER — SIMVASTATIN 20 MG/1
TAKE 1 TABLET AT BEDTIME TABLET, FILM COATED ORAL
Refills: 0 | Status: ACTIVE | COMMUNITY
Start: 2018-06-19

## 2024-09-05 RX ORDER — ONDANSETRON 4 MG/1
PLACE 1 TABLET ON THE TONGUE AND ALLOW TO DISSOLVE, EVERY 4-6 HOURS AS NEEDED FOR NAUSEA TABLET, ORALLY DISINTEGRATING ORAL
Qty: 45 TABLET | Refills: 1 | OUTPATIENT
Start: 2024-09-05

## 2024-09-05 RX ORDER — TOCILIZUMAB 180 MG/ML
0.9 ML INJECTION, SOLUTION SUBCUTANEOUS
Status: ACTIVE | COMMUNITY

## 2024-09-05 RX ORDER — HYOSCYAMINE SULFATE 0.12 MG/1
PLACE 1 TABLET UNDER THE TONGUE AND ALLOW TO DISSOLVE EVERY 4 TO 6 HOURS AS NEEDED FOR ABDOMINAL PAIN TABLET SUBLINGUAL
Status: ACTIVE | COMMUNITY

## 2024-09-05 RX ORDER — LIFITEGRAST 50 MG/ML
SOLUTION/ DROPS OPHTHALMIC
Qty: 180 | Refills: 0 | Status: ACTIVE | COMMUNITY
Start: 2018-01-05

## 2024-09-05 RX ORDER — THYROID, PORCINE 60 MG/1
TAKE 1 TABLET DAILY TABLET ORAL
Refills: 0 | Status: ACTIVE | COMMUNITY
Start: 2019-02-12

## 2024-09-05 RX ORDER — ETANERCEPT 50 MG/ML
SOLUTION SUBCUTANEOUS
Qty: 392 | Refills: 0 | Status: ON HOLD | COMMUNITY
Start: 2017-12-05

## 2024-09-05 NOTE — HPI-TODAY'S VISIT:
53-year-old female with a history of rheumatoid arthritis (Dr. Jo), hypothyroidism, constipation predominant irritable bowel syndrome, GERD, diverticulitis, presenting for follow-up regarding abdominal pain. She was previously managed in our office by Dr. Choudhury. She was seen in the office in June for follow-up regarding GERD and dyspepsia. Her symptoms were overall managed with twice daily omeprazole. Recent CT a/p with contrast showed no acute process. She was encouraged a food/symptom diary to identify triggers of occasional breakthrough symptoms, with consideration for repeat upper endoscopy pending clinical course; she declined to schedule at that time. Her symptoms related to IBS-C were managed with Ibsrela. She was to add MiraLAX as needed for bouts of constipation. Within the last several months, she has experienced an increase in epigastric pain and nausea following meals. She becomes full quickly. She is vomiting about every other week. She has infrequent reflux symptoms on twice daily omeprazole. Her bowel habits are fairly regular with Ibsrela, with bouts of constipation if she eats greasy food. She denies NSAID use.

## 2024-09-05 NOTE — HPI-OTHER HISTORIES
CT a/p with contrast 3/6/24: no acute process. EGD was not performed. Colonoscopy 4/26/22: mild ascending colon diverticulosis, nonbleeding internal hemorrhoids. A repeat colonoscopy was recommended in 10 years for screening. CT of the abdomen with contrast was performed on 10/21/2021.  This showed fatty liver but otherwise no acute process.  Subsequent ultrasound of the pelvis 10/21/2021 revealed mildly prominent vaginal canal and cervix with mildly heterogenous echogenicity, prior hysterectomy, a 7 mm left ovarian follicular cyst.  Results were sent to her gynecologist, Dr. Little. EGD was performed on 9/3/2021.  Findings included a normal hypopharynx, regular Z-line, gastroesophageal flap valve classified as Hill grade II (fold present, opens with respiration), nonerosive gastritis, normal examined duodenum. Antral biopsies showed chemical/reactive gastropathy, negative for H pylori.

## 2024-09-26 ENCOUNTER — OFFICE VISIT (OUTPATIENT)
Dept: URBAN - METROPOLITAN AREA SURGERY CENTER 25 | Facility: SURGERY CENTER | Age: 54
End: 2024-09-26

## 2024-09-26 RX ORDER — METHOTREXATE 2.5 MG/1
TAKE 8 TABLETS PER WEEK TABLET ORAL
Refills: 0 | Status: ACTIVE | COMMUNITY

## 2024-09-26 RX ORDER — OMEPRAZOLE 40 MG/1
1 CAPSULE TWICE DAILY, PRIOR TO BREAKFAST AND DINNER CAPSULE, DELAYED RELEASE ORAL TWICE DAILY
Status: ACTIVE | COMMUNITY
Start: 2024-02-27

## 2024-09-26 RX ORDER — FOLIC ACID 1 MG/1
TAKE 1 TABLET DAILY TABLET ORAL
Refills: 0 | Status: ACTIVE | COMMUNITY

## 2024-09-26 RX ORDER — OMEGA-3/DHA/EPA/FISH OIL 120-180 MG
1 CAPSULE CAPSULE ORAL ONCE A DAY
Status: ACTIVE | COMMUNITY

## 2024-09-26 RX ORDER — TOCILIZUMAB 180 MG/ML
0.9 ML INJECTION, SOLUTION SUBCUTANEOUS
Status: ACTIVE | COMMUNITY

## 2024-09-26 RX ORDER — SIMVASTATIN 20 MG/1
TAKE 1 TABLET AT BEDTIME TABLET, FILM COATED ORAL
Refills: 0 | Status: ACTIVE | COMMUNITY
Start: 2018-06-19

## 2024-09-26 RX ORDER — LIFITEGRAST 50 MG/ML
SOLUTION/ DROPS OPHTHALMIC
Qty: 180 | Refills: 0 | Status: ACTIVE | COMMUNITY
Start: 2018-01-05

## 2024-09-26 RX ORDER — LINACLOTIDE 290 UG/1
TAKE 1 CAPSULE BY MOUTH DAILY BEFORE A MEAL CAPSULE, GELATIN COATED ORAL
Qty: 90 CAPSULE | Refills: 3 | Status: ON HOLD | COMMUNITY

## 2024-09-26 RX ORDER — TENAPANOR HYDROCHLORIDE 53.2 MG/1
1 TABLET PRIOR TO BREAKFAST AND DINNER TABLET ORAL TWICE A DAY
Status: ACTIVE | COMMUNITY
Start: 2023-11-27

## 2024-09-26 RX ORDER — UPADACITINIB 15 MG/1
1 TABLET TABLET, EXTENDED RELEASE ORAL ONCE A DAY
Status: ON HOLD | COMMUNITY

## 2024-09-26 RX ORDER — THYROID, PORCINE 60 MG/1
TAKE 1 TABLET DAILY TABLET ORAL
Refills: 0 | Status: ACTIVE | COMMUNITY
Start: 2019-02-12

## 2024-09-26 RX ORDER — ETANERCEPT 50 MG/ML
SOLUTION SUBCUTANEOUS
Qty: 392 | Refills: 0 | Status: ON HOLD | COMMUNITY
Start: 2017-12-05

## 2024-09-26 RX ORDER — HYOSCYAMINE SULFATE 0.12 MG/1
PLACE 1 TABLET UNDER THE TONGUE AND ALLOW TO DISSOLVE EVERY 4 TO 6 HOURS AS NEEDED FOR ABDOMINAL PAIN TABLET SUBLINGUAL
Status: ACTIVE | COMMUNITY

## 2024-09-26 RX ORDER — ONDANSETRON 4 MG/1
PLACE 1 TABLET ON THE TONGUE AND ALLOW TO DISSOLVE, EVERY 4-6 HOURS AS NEEDED FOR NAUSEA TABLET, ORALLY DISINTEGRATING ORAL
Qty: 45 TABLET | Refills: 1 | Status: ACTIVE | COMMUNITY
Start: 2024-09-05

## 2024-10-09 ENCOUNTER — TELEPHONE ENCOUNTER (OUTPATIENT)
Dept: URBAN - METROPOLITAN AREA CLINIC 113 | Facility: CLINIC | Age: 54
End: 2024-10-09

## 2024-10-16 ENCOUNTER — TELEPHONE ENCOUNTER (OUTPATIENT)
Dept: URBAN - METROPOLITAN AREA CLINIC 113 | Facility: CLINIC | Age: 54
End: 2024-10-16

## 2024-12-16 ENCOUNTER — OFFICE VISIT (OUTPATIENT)
Dept: URBAN - METROPOLITAN AREA CLINIC 113 | Facility: CLINIC | Age: 54
End: 2024-12-16
Payer: COMMERCIAL

## 2024-12-16 VITALS
TEMPERATURE: 97.7 F | HEART RATE: 81 BPM | DIASTOLIC BLOOD PRESSURE: 82 MMHG | RESPIRATION RATE: 18 BRPM | SYSTOLIC BLOOD PRESSURE: 123 MMHG | HEIGHT: 61 IN | BODY MASS INDEX: 27.23 KG/M2 | WEIGHT: 144.2 LBS

## 2024-12-16 DIAGNOSIS — K58.1 IRRITABLE BOWEL SYNDROME WITH CONSTIPATION: ICD-10-CM

## 2024-12-16 DIAGNOSIS — K30 FUNCTIONAL DYSPEPSIA: ICD-10-CM

## 2024-12-16 DIAGNOSIS — K31.84 GASTROPARESIS: ICD-10-CM

## 2024-12-16 DIAGNOSIS — K21.9 GERD: ICD-10-CM

## 2024-12-16 PROCEDURE — 99214 OFFICE O/P EST MOD 30 MIN: CPT | Performed by: NURSE PRACTITIONER

## 2024-12-16 RX ORDER — HYOSCYAMINE SULFATE 0.12 MG/1
PLACE 1 TABLET UNDER THE TONGUE AND ALLOW TO DISSOLVE AS NEEDED FOR ABDOMINAL PAIN TABLET SUBLINGUAL
Qty: 45 | Refills: 1 | OUTPATIENT
Start: 2024-12-16 | End: 2025-01-05

## 2024-12-16 RX ORDER — HYOSCYAMINE SULFATE 0.12 MG/1
PLACE 1 TABLET UNDER THE TONGUE AND ALLOW TO DISSOLVE EVERY 4 TO 6 HOURS AS NEEDED FOR ABDOMINAL PAIN TABLET SUBLINGUAL
Status: ACTIVE | COMMUNITY

## 2024-12-16 RX ORDER — LIFITEGRAST 50 MG/ML
SOLUTION/ DROPS OPHTHALMIC
Qty: 180 | Refills: 0 | Status: ACTIVE | COMMUNITY
Start: 2018-01-05

## 2024-12-16 RX ORDER — ETANERCEPT 50 MG/ML
SOLUTION SUBCUTANEOUS
Qty: 392 | Refills: 0 | Status: ON HOLD | COMMUNITY
Start: 2017-12-05

## 2024-12-16 RX ORDER — FOLIC ACID 1 MG/1
TAKE 1 TABLET DAILY TABLET ORAL
Refills: 0 | Status: ACTIVE | COMMUNITY

## 2024-12-16 RX ORDER — OMEPRAZOLE 40 MG/1
1 CAPSULE TWICE DAILY, PRIOR TO BREAKFAST AND DINNER CAPSULE, DELAYED RELEASE ORAL TWICE DAILY
OUTPATIENT
Start: 2024-02-27

## 2024-12-16 RX ORDER — ONDANSETRON 4 MG/1
PLACE 1 TABLET ON THE TONGUE AND ALLOW TO DISSOLVE, EVERY 4-6 HOURS AS NEEDED FOR NAUSEA TABLET, ORALLY DISINTEGRATING ORAL
OUTPATIENT
Start: 2024-09-05

## 2024-12-16 RX ORDER — TOCILIZUMAB 180 MG/ML
0.9 ML INJECTION, SOLUTION SUBCUTANEOUS
Status: ACTIVE | COMMUNITY

## 2024-12-16 RX ORDER — OMEPRAZOLE 40 MG/1
1 CAPSULE TWICE DAILY, PRIOR TO BREAKFAST AND DINNER CAPSULE, DELAYED RELEASE ORAL TWICE DAILY
Status: ACTIVE | COMMUNITY
Start: 2024-02-27

## 2024-12-16 RX ORDER — TENAPANOR HYDROCHLORIDE 53.2 MG/1
1 TABLET PRIOR TO BREAKFAST AND DINNER TABLET ORAL TWICE A DAY
OUTPATIENT
Start: 2023-11-27

## 2024-12-16 RX ORDER — ONDANSETRON 4 MG/1
PLACE 1 TABLET ON THE TONGUE AND ALLOW TO DISSOLVE, EVERY 4-6 HOURS AS NEEDED FOR NAUSEA TABLET, ORALLY DISINTEGRATING ORAL
Qty: 45 TABLET | Refills: 1 | Status: ACTIVE | COMMUNITY
Start: 2024-09-05

## 2024-12-16 RX ORDER — TENAPANOR HYDROCHLORIDE 53.2 MG/1
1 TABLET PRIOR TO BREAKFAST AND DINNER TABLET ORAL TWICE A DAY
Status: ACTIVE | COMMUNITY
Start: 2023-11-27

## 2024-12-16 RX ORDER — LINACLOTIDE 290 UG/1
TAKE 1 CAPSULE BY MOUTH DAILY BEFORE A MEAL CAPSULE, GELATIN COATED ORAL
Qty: 90 CAPSULE | Refills: 3 | Status: ON HOLD | COMMUNITY

## 2024-12-16 RX ORDER — THYROID, PORCINE 60 MG/1
TAKE 1 TABLET DAILY TABLET ORAL
Refills: 0 | Status: ACTIVE | COMMUNITY
Start: 2019-02-12

## 2024-12-16 RX ORDER — METHOTREXATE 2.5 MG/1
TAKE 8 TABLETS PER WEEK TABLET ORAL
Refills: 0 | Status: ACTIVE | COMMUNITY

## 2024-12-16 RX ORDER — SIMVASTATIN 20 MG/1
TAKE 1 TABLET AT BEDTIME TABLET, FILM COATED ORAL
Refills: 0 | Status: ACTIVE | COMMUNITY
Start: 2018-06-19

## 2024-12-16 RX ORDER — BUSPIRONE HYDROCHLORIDE 5 MG/1
1 TABLET TABLET ORAL TWICE A DAY
Qty: 180 TABLET | Refills: 3 | OUTPATIENT
Start: 2024-12-16 | End: 2025-12-11

## 2024-12-16 RX ORDER — UPADACITINIB 15 MG/1
1 TABLET TABLET, EXTENDED RELEASE ORAL ONCE A DAY
Status: ON HOLD | COMMUNITY

## 2024-12-16 RX ORDER — OMEGA-3/DHA/EPA/FISH OIL 120-180 MG
1 CAPSULE CAPSULE ORAL ONCE A DAY
Status: ACTIVE | COMMUNITY

## 2024-12-16 NOTE — HPI-TODAY'S VISIT:
54-year-old female with a history of rheumatoid arthritis (Dr. Jo), hypothyroidism, constipation predominant irritable bowel syndrome, GERD, diverticulitis, presenting for follow-up regarding abdominal pain.  She was seen in the office in September for follow-up regarding increasing symptoms of epigastric pain, nausea/vomiting, and early satiety despite bid PPI. Prior CT was negative. An upper endoscopy was planned to evaluate for gastroesophageal pathology, including ulcer disease or H pylori, along with a gastric emptying study to evaluate for dysmotility. Ondansetron was provided to use as needed. Buspirone was considered for nonulcer dyspepsia. She was to continue Ibsrela and prn hyoscyamine for IBS-C. Gastric emptying study 10/3/2024:Slight delay in gastric emptying with 21% at 1 hour (normal 30-90%) and 81% at 4 hours (normal greater than 90%). EGD 9/26/2024:Normal esophagus, regular Z-line at 37 cm, normal stomach and examined duodenum.  Gastric biopsies were negative for H. pylori. Her symptoms are largely unchanged. She continues to experience upper abdominal discomfort and nausea following most meals. This is worsened if she overeats, but still occurs even with small meals. She states she is often fearful to eat due to the symptoms. She has occasional breakthrough reflux despite her regimen with omeprazole. Her constipation is overall managed with Ibsrela which she is taking 1-2x daily. She needs a refill of her hyoscyamine. Her weight is stable.

## 2025-02-18 ENCOUNTER — ERX REFILL RESPONSE (OUTPATIENT)
Dept: URBAN - METROPOLITAN AREA CLINIC 113 | Facility: CLINIC | Age: 55
End: 2025-02-18

## 2025-02-18 RX ORDER — OMEPRAZOLE 40 MG/1
TAKE 1 CAPSULE BY MOUTH TWICE DAILY BEFORE BREAKFAST AND DINNER CAPSULE, DELAYED RELEASE ORAL
Qty: 180 CAPSULE | Refills: 3 | OUTPATIENT

## 2025-02-18 RX ORDER — OMEPRAZOLE 40 MG/1
1 CAPSULE TWICE DAILY, PRIOR TO BREAKFAST AND DINNER CAPSULE, DELAYED RELEASE ORAL TWICE DAILY
OUTPATIENT

## 2025-03-17 ENCOUNTER — OFFICE VISIT (OUTPATIENT)
Dept: URBAN - METROPOLITAN AREA CLINIC 113 | Facility: CLINIC | Age: 55
End: 2025-03-17

## 2025-04-11 ENCOUNTER — LAB OUTSIDE AN ENCOUNTER (OUTPATIENT)
Dept: URBAN - METROPOLITAN AREA CLINIC 113 | Facility: CLINIC | Age: 55
End: 2025-04-11

## 2025-04-11 ENCOUNTER — OFFICE VISIT (OUTPATIENT)
Dept: URBAN - METROPOLITAN AREA CLINIC 113 | Facility: CLINIC | Age: 55
End: 2025-04-11
Payer: COMMERCIAL

## 2025-04-11 DIAGNOSIS — R68.81 EARLY SATIETY: ICD-10-CM

## 2025-04-11 DIAGNOSIS — K31.84 GASTROPARESIS: ICD-10-CM

## 2025-04-11 DIAGNOSIS — R11.0 NAUSEA: ICD-10-CM

## 2025-04-11 DIAGNOSIS — R63.4 WEIGHT LOSS, UNINTENTIONAL: ICD-10-CM

## 2025-04-11 DIAGNOSIS — R10.13 EPIGASTRIC ABDOMINAL PAIN: ICD-10-CM

## 2025-04-11 DIAGNOSIS — K58.1 IRRITABLE BOWEL SYNDROME WITH CONSTIPATION: ICD-10-CM

## 2025-04-11 PROCEDURE — 99214 OFFICE O/P EST MOD 30 MIN: CPT | Performed by: NURSE PRACTITIONER

## 2025-04-11 RX ORDER — ETANERCEPT 50 MG/ML
SOLUTION SUBCUTANEOUS
Qty: 392 | Refills: 0 | Status: ON HOLD | COMMUNITY
Start: 2017-12-05

## 2025-04-11 RX ORDER — OMEPRAZOLE 40 MG/1
TAKE 1 CAPSULE BY MOUTH TWICE DAILY BEFORE BREAKFAST AND DINNER CAPSULE, DELAYED RELEASE ORAL
OUTPATIENT

## 2025-04-11 RX ORDER — OMEGA-3/DHA/EPA/FISH OIL 120-180 MG
1 CAPSULE CAPSULE ORAL ONCE A DAY
Status: ACTIVE | COMMUNITY

## 2025-04-11 RX ORDER — HYOSCYAMINE SULFATE 0.12 MG/1
PLACE 1 TABLET UNDER THE TONGUE AND ALLOW TO DISSOLVE EVERY 4 TO 6 HOURS AS NEEDED FOR ABDOMINAL PAIN TABLET SUBLINGUAL
OUTPATIENT

## 2025-04-11 RX ORDER — LINACLOTIDE 290 UG/1
TAKE 1 CAPSULE BY MOUTH DAILY BEFORE A MEAL CAPSULE, GELATIN COATED ORAL
Qty: 90 CAPSULE | Refills: 3 | Status: ON HOLD | COMMUNITY

## 2025-04-11 RX ORDER — FOLIC ACID 1 MG/1
TAKE 1 TABLET DAILY TABLET ORAL
Refills: 0 | Status: ACTIVE | COMMUNITY

## 2025-04-11 RX ORDER — TENAPANOR HYDROCHLORIDE 53.2 MG/1
1 TABLET PRIOR TO BREAKFAST AND DINNER TABLET ORAL TWICE A DAY
OUTPATIENT
Start: 2023-11-27

## 2025-04-11 RX ORDER — LIFITEGRAST 50 MG/ML
SOLUTION/ DROPS OPHTHALMIC
Qty: 180 | Refills: 0 | Status: ACTIVE | COMMUNITY
Start: 2018-01-05

## 2025-04-11 RX ORDER — ONDANSETRON 4 MG/1
PLACE 1 TABLET ON THE TONGUE AND ALLOW TO DISSOLVE, EVERY 4-6 HOURS AS NEEDED FOR NAUSEA TABLET, ORALLY DISINTEGRATING ORAL
Status: ACTIVE | COMMUNITY
Start: 2024-09-05

## 2025-04-11 RX ORDER — TOCILIZUMAB 180 MG/ML
0.9 ML INJECTION, SOLUTION SUBCUTANEOUS
Status: ON HOLD | COMMUNITY

## 2025-04-11 RX ORDER — ONDANSETRON 4 MG/1
PLACE 1 TABLET ON THE TONGUE AND ALLOW TO DISSOLVE, EVERY 4-6 HOURS AS NEEDED FOR NAUSEA TABLET, ORALLY DISINTEGRATING ORAL
OUTPATIENT
Start: 2024-09-05

## 2025-04-11 RX ORDER — SIMVASTATIN 20 MG/1
TAKE 1 TABLET AT BEDTIME TABLET, FILM COATED ORAL
Refills: 0 | Status: ACTIVE | COMMUNITY
Start: 2018-06-19

## 2025-04-11 RX ORDER — METHOTREXATE 2.5 MG/1
TAKE 8 TABLETS PER WEEK TABLET ORAL
Refills: 0 | Status: ACTIVE | COMMUNITY

## 2025-04-11 RX ORDER — UPADACITINIB 15 MG/1
1 TABLET TABLET, EXTENDED RELEASE ORAL ONCE A DAY
Status: ON HOLD | COMMUNITY

## 2025-04-11 RX ORDER — BUSPIRONE HYDROCHLORIDE 5 MG/1
1 TABLET TABLET ORAL TWICE A DAY
OUTPATIENT
Start: 2024-12-16

## 2025-04-11 RX ORDER — TENAPANOR HYDROCHLORIDE 53.2 MG/1
1 TABLET PRIOR TO BREAKFAST AND DINNER TABLET ORAL TWICE A DAY
Status: ACTIVE | COMMUNITY
Start: 2023-11-27

## 2025-04-11 RX ORDER — THYROID, PORCINE 60 MG/1
TAKE 1 TABLET DAILY TABLET ORAL
Refills: 0 | Status: ACTIVE | COMMUNITY
Start: 2019-02-12

## 2025-04-11 RX ORDER — HYOSCYAMINE SULFATE 0.12 MG/1
PLACE 1 TABLET UNDER THE TONGUE AND ALLOW TO DISSOLVE EVERY 4 TO 6 HOURS AS NEEDED FOR ABDOMINAL PAIN TABLET SUBLINGUAL
Status: ACTIVE | COMMUNITY

## 2025-04-11 RX ORDER — OMEPRAZOLE 40 MG/1
TAKE 1 CAPSULE BY MOUTH TWICE DAILY BEFORE BREAKFAST AND DINNER CAPSULE, DELAYED RELEASE ORAL
Qty: 180 CAPSULE | Refills: 3 | Status: ACTIVE | COMMUNITY

## 2025-04-11 RX ORDER — BUSPIRONE HYDROCHLORIDE 5 MG/1
1 TABLET TABLET ORAL TWICE A DAY
Qty: 180 TABLET | Refills: 3 | Status: ACTIVE | COMMUNITY
Start: 2024-12-16 | End: 2025-12-11

## 2025-04-11 NOTE — HPI-TODAY'S VISIT:
Follow-up visit 12/16/24 54-year-old female with a history of rheumatoid arthritis (Dr. Jo), hypothyroidism, constipation predominant irritable bowel syndrome, GERD, diverticulitis, presenting for follow-up regarding abdominal pain.  She was seen in the office in September for follow-up regarding increasing symptoms of epigastric pain, nausea/vomiting, and early satiety despite bid PPI. Prior CT was negative. An upper endoscopy was planned to evaluate for gastroesophageal pathology, including ulcer disease or H pylori, along with a gastric emptying study to evaluate for dysmotility. Ondansetron was provided to use as needed. Buspirone was considered for nonulcer dyspepsia. She was to continue Ibsrela and prn hyoscyamine for IBS-C. Gastric emptying study 10/3/2024:Slight delay in gastric emptying with 21% at 1 hour (normal 30-90%) and 81% at 4 hours (normal greater than 90%). EGD 9/26/2024:Normal esophagus, regular Z-line at 37 cm, normal stomach and examined duodenum.  Gastric biopsies were negative for H. pylori. Her symptoms are largely unchanged. She continues to experience upper abdominal discomfort and nausea following most meals. This is worsened if she overeats, but still occurs even with small meals. She states she is often fearful to eat due to the symptoms. She has occasional breakthrough reflux despite her regimen with omeprazole. Her constipation is overall managed with Ibsrela which she is taking 1-2x daily. She needs a refill of her hyoscyamine. Her weight is stable. Follow-up visit 4/11/25 She was seen in the office in December for follow-up regarding chronic abdominal pain and nausea suspected to be multifactorial related to a functional bowel disorder and mild gastroparesis.  She was continue omeprazole and ondansetron, and recommended a trial of buspirone for nonulcer dyspepsia.  She was encouraged a low residue diet with smaller more frequent meals.  Regarding IBS-C.  Her symptoms were overall managed with Ibsrela and hyoscyamine. Her symptoms are largely unchanged.  She reports little benefit from buspirone, but admits to only taking it once per day.  She continues to experience epigastric pain and nausea following most meals.  She states she is scared to eat because of this.  She becomes full quickly after taking just a few bites.  Her weight has continued to decline.  She is taking ondansetron a few times per week.  Her bowel habits are regular with Ibsrela, 1 to 2 tablets daily.  No blood in the stool.

## 2025-04-30 ENCOUNTER — TELEPHONE ENCOUNTER (OUTPATIENT)
Dept: URBAN - METROPOLITAN AREA CLINIC 113 | Facility: CLINIC | Age: 55
End: 2025-04-30

## 2025-04-30 PROBLEM — 16846341000119101: Status: ACTIVE | Noted: 2025-04-30

## 2025-05-29 ENCOUNTER — TELEPHONE ENCOUNTER (OUTPATIENT)
Dept: URBAN - METROPOLITAN AREA CLINIC 113 | Facility: CLINIC | Age: 55
End: 2025-05-29

## 2025-07-17 ENCOUNTER — OFFICE VISIT (OUTPATIENT)
Dept: URBAN - METROPOLITAN AREA CLINIC 113 | Facility: CLINIC | Age: 55
End: 2025-07-17

## 2025-08-27 ENCOUNTER — OFFICE VISIT (OUTPATIENT)
Dept: URBAN - METROPOLITAN AREA CLINIC 113 | Facility: CLINIC | Age: 55
End: 2025-08-27
Payer: COMMERCIAL

## 2025-08-27 DIAGNOSIS — R10.13 DYSPEPSIA: ICD-10-CM

## 2025-08-27 DIAGNOSIS — K31.84 GASTROPARESIS: ICD-10-CM

## 2025-08-27 DIAGNOSIS — R63.4 WEIGHT LOSS, UNINTENTIONAL: ICD-10-CM

## 2025-08-27 DIAGNOSIS — R10.13 EPIGASTRIC ABDOMINAL PAIN: ICD-10-CM

## 2025-08-27 DIAGNOSIS — R11.0 NAUSEA: ICD-10-CM

## 2025-08-27 DIAGNOSIS — R68.81 EARLY SATIETY: ICD-10-CM

## 2025-08-27 DIAGNOSIS — K58.1 IRRITABLE BOWEL SYNDROME WITH CONSTIPATION: ICD-10-CM

## 2025-08-27 PROCEDURE — 99214 OFFICE O/P EST MOD 30 MIN: CPT | Performed by: STUDENT IN AN ORGANIZED HEALTH CARE EDUCATION/TRAINING PROGRAM

## 2025-08-27 RX ORDER — THYROID, PORCINE 60 MG/1
TAKE 1 TABLET DAILY TABLET ORAL
Refills: 0 | Status: ACTIVE | COMMUNITY
Start: 2019-02-12

## 2025-08-27 RX ORDER — TENAPANOR HYDROCHLORIDE 53.2 MG/1
1 TABLET PRIOR TO BREAKFAST AND DINNER TABLET ORAL TWICE A DAY
Status: ACTIVE | COMMUNITY
Start: 2023-11-27

## 2025-08-27 RX ORDER — ONDANSETRON 4 MG/1
PLACE 1 TABLET ON THE TONGUE AND ALLOW TO DISSOLVE, EVERY 4-6 HOURS AS NEEDED FOR NAUSEA TABLET, ORALLY DISINTEGRATING ORAL
Status: ACTIVE | COMMUNITY
Start: 2024-09-05

## 2025-08-27 RX ORDER — TOCILIZUMAB 180 MG/ML
0.9 ML INJECTION, SOLUTION SUBCUTANEOUS
Status: ON HOLD | COMMUNITY

## 2025-08-27 RX ORDER — LINACLOTIDE 290 UG/1
TAKE 1 CAPSULE BY MOUTH DAILY BEFORE A MEAL CAPSULE, GELATIN COATED ORAL
Qty: 90 CAPSULE | Refills: 3 | Status: ON HOLD | COMMUNITY

## 2025-08-27 RX ORDER — OMEPRAZOLE 40 MG/1
TAKE 1 CAPSULE BY MOUTH TWICE DAILY BEFORE BREAKFAST AND DINNER CAPSULE, DELAYED RELEASE ORAL
OUTPATIENT
Start: 2025-08-27

## 2025-08-27 RX ORDER — ETANERCEPT 50 MG/ML
SOLUTION SUBCUTANEOUS
Qty: 392 | Refills: 0 | Status: ON HOLD | COMMUNITY
Start: 2017-12-05

## 2025-08-27 RX ORDER — OMEGA-3/DHA/EPA/FISH OIL 120-180 MG
1 CAPSULE CAPSULE ORAL ONCE A DAY
Status: ACTIVE | COMMUNITY

## 2025-08-27 RX ORDER — LIFITEGRAST 50 MG/ML
SOLUTION/ DROPS OPHTHALMIC
Qty: 180 | Refills: 0 | Status: ACTIVE | COMMUNITY
Start: 2018-01-05

## 2025-08-27 RX ORDER — METHOTREXATE 2.5 MG/1
TAKE 8 TABLETS PER WEEK TABLET ORAL
Refills: 0 | Status: ACTIVE | COMMUNITY

## 2025-08-27 RX ORDER — HYOSCYAMINE SULFATE 0.12 MG/1
PLACE 1 TABLET UNDER THE TONGUE AND ALLOW TO DISSOLVE EVERY 4 TO 6 HOURS AS NEEDED FOR ABDOMINAL PAIN TABLET SUBLINGUAL
OUTPATIENT
Start: 2025-08-27

## 2025-08-27 RX ORDER — ONDANSETRON 4 MG/1
PLACE 1 TABLET ON THE TONGUE AND ALLOW TO DISSOLVE, EVERY 4-6 HOURS AS NEEDED FOR NAUSEA TABLET, ORALLY DISINTEGRATING ORAL
OUTPATIENT
Start: 2025-08-27

## 2025-08-27 RX ORDER — SIMVASTATIN 20 MG/1
TAKE 1 TABLET AT BEDTIME TABLET, FILM COATED ORAL
Refills: 0 | Status: ACTIVE | COMMUNITY
Start: 2018-06-19

## 2025-08-27 RX ORDER — BUSPIRONE HYDROCHLORIDE 10 MG/1
1 TABLET TABLET ORAL TWICE A DAY
Qty: 180 TABLET | Refills: 1 | OUTPATIENT
Start: 2025-08-27 | End: 2026-02-23

## 2025-08-27 RX ORDER — OMEPRAZOLE 40 MG/1
TAKE 1 CAPSULE BY MOUTH TWICE DAILY BEFORE BREAKFAST AND DINNER CAPSULE, DELAYED RELEASE ORAL
Status: ACTIVE | COMMUNITY

## 2025-08-27 RX ORDER — HYOSCYAMINE SULFATE 0.12 MG/1
PLACE 1 TABLET UNDER THE TONGUE AND ALLOW TO DISSOLVE EVERY 4 TO 6 HOURS AS NEEDED FOR ABDOMINAL PAIN TABLET SUBLINGUAL
Status: ACTIVE | COMMUNITY

## 2025-08-27 RX ORDER — TENAPANOR HYDROCHLORIDE 53.2 MG/1
1 TABLET PRIOR TO BREAKFAST AND DINNER TABLET ORAL TWICE A DAY
OUTPATIENT
Start: 2025-08-27

## 2025-08-27 RX ORDER — UPADACITINIB 15 MG/1
1 TABLET TABLET, EXTENDED RELEASE ORAL ONCE A DAY
Status: ON HOLD | COMMUNITY

## 2025-08-27 RX ORDER — BUSPIRONE HYDROCHLORIDE 5 MG/1
1 TABLET TABLET ORAL TWICE A DAY
Status: ACTIVE | COMMUNITY
Start: 2024-12-16

## 2025-08-27 RX ORDER — FOLIC ACID 1 MG/1
TAKE 1 TABLET DAILY TABLET ORAL
Refills: 0 | Status: ACTIVE | COMMUNITY